# Patient Record
Sex: FEMALE | Race: BLACK OR AFRICAN AMERICAN | Employment: UNEMPLOYED | URBAN - METROPOLITAN AREA
[De-identification: names, ages, dates, MRNs, and addresses within clinical notes are randomized per-mention and may not be internally consistent; named-entity substitution may affect disease eponyms.]

---

## 2017-02-14 RX ORDER — QUETIAPINE FUMARATE 25 MG/1
TABLET, FILM COATED ORAL
Qty: 30 TAB | Refills: 0 | Status: SHIPPED | OUTPATIENT
Start: 2017-02-14 | End: 2017-05-04 | Stop reason: SDUPTHER

## 2017-08-24 RX ORDER — PRAVASTATIN SODIUM 20 MG/1
TABLET ORAL
Qty: 30 TAB | Refills: 11 | Status: SHIPPED | OUTPATIENT
Start: 2017-08-24 | End: 2018-05-16 | Stop reason: SDUPTHER

## 2017-11-04 RX ORDER — QUETIAPINE FUMARATE 25 MG/1
TABLET, FILM COATED ORAL
Qty: 30 TAB | Refills: 4 | Status: SHIPPED | OUTPATIENT
Start: 2017-11-04 | End: 2018-05-16 | Stop reason: SDUPTHER

## 2018-05-16 ENCOUNTER — OFFICE VISIT (OUTPATIENT)
Dept: INTERNAL MEDICINE CLINIC | Age: 43
End: 2018-05-16

## 2018-05-16 VITALS
HEART RATE: 91 BPM | OXYGEN SATURATION: 18 % | HEIGHT: 67 IN | WEIGHT: 265.8 LBS | BODY MASS INDEX: 41.72 KG/M2 | SYSTOLIC BLOOD PRESSURE: 107 MMHG | DIASTOLIC BLOOD PRESSURE: 73 MMHG | TEMPERATURE: 97.7 F

## 2018-05-16 DIAGNOSIS — Z00.00 MEDICARE ANNUAL WELLNESS VISIT, SUBSEQUENT: ICD-10-CM

## 2018-05-16 DIAGNOSIS — Z12.31 ENCOUNTER FOR SCREENING MAMMOGRAM FOR MALIGNANT NEOPLASM OF BREAST: ICD-10-CM

## 2018-05-16 DIAGNOSIS — Z13.31 SCREENING FOR DEPRESSION: ICD-10-CM

## 2018-05-16 DIAGNOSIS — E11.9 TYPE 2 DIABETES MELLITUS WITHOUT COMPLICATION, WITHOUT LONG-TERM CURRENT USE OF INSULIN (HCC): ICD-10-CM

## 2018-05-16 DIAGNOSIS — Z13.39 SCREENING FOR ALCOHOLISM: ICD-10-CM

## 2018-05-16 DIAGNOSIS — E66.01 OBESITY, MORBID (HCC): ICD-10-CM

## 2018-05-16 RX ORDER — PRAVASTATIN SODIUM 20 MG/1
20 TABLET ORAL
Qty: 30 TAB | Refills: 11 | Status: SHIPPED | OUTPATIENT
Start: 2018-05-16 | End: 2018-05-29 | Stop reason: SDUPTHER

## 2018-05-16 RX ORDER — QUETIAPINE FUMARATE 25 MG/1
25 TABLET, FILM COATED ORAL
Qty: 30 TAB | Refills: 11 | Status: SHIPPED | OUTPATIENT
Start: 2018-05-16 | End: 2019-06-11 | Stop reason: SDUPTHER

## 2018-05-16 NOTE — PATIENT INSTRUCTIONS
Medicare Wellness Visit, Female    The best way to live healthy is to have a healthy lifestyle by eating a well-balanced diet, exercising regularly, limiting alcohol and stopping smoking. Regular physical exams and screening tests are another way to keep healthy. Preventive exams provided by your health care provider can find health problems before they become diseases or illnesses. Preventive services including immunizations, screening tests, monitoring and exams can help you take care of your own health. All people over age 72 should have a pneumovax  and and a prevnar shot to prevent pneumonia. These are once in a lifetime unless you and your provider decide differently. All people over 65 should have a yearly flu shot and a tetanus vaccine every 10 years. A bone mass density to screen for osteoporosis or thinning of the bones should be done every 2 years after 65. Screening for diabetes mellitus with a blood sugar test should be done every year. Glaucoma is a disease of the eye due to increased ocular pressure that can lead to blindness and it should be done every year by an eye professional.    Cardiovascular screening tests that check for elevated lipids (fatty part of blood) which can lead to heart disease and strokes should be done every 5 years. Colorectal screening that evaluates for blood or polyps in your colon should be done yearly as a stool test or every five years as a flexible sigmoidoscope or every 10 years as a colonoscopy up to age 76. Breast cancer screening with a mammogram is recommended biennially  for women age 54-69. Screening for cervical cancer with a pap smear and pelvic exam is recommended for women after age 72 years every 2 years up to age 79 or when the provider and patient decide to stop. If there is a history of cervical abnormalities or other increased risk for cancer then the test is recommended yearly.     Hepatitis C screening is also recommended for anyone born between [birth year] through [address]. A shingles vaccine is also recommended once in a lifetime after age 61. Your Medicare Wellness Exam is recommended annually. Here is a list of your current Health Maintenance items with a due date:  Health Maintenance Due   Topic Date Due    Diabetic Foot Care  01/29/1984    Eye Exam  01/29/1984    Albumin Urine Test  08/04/2016    Hemoglobin A1C    01/05/2017    Cholesterol Test   07/05/2017    Annual Well Visit  03/14/2018    Cervical Cancer Screening  08/04/2018              Body Mass Index: Care Instructions  Your Care Instructions    Body mass index (BMI) can help you see if your weight is raising your risk for health problems. It uses a formula to compare how much you weigh with how tall you are. · A BMI lower than 18.5 is considered underweight. · A BMI between 18.5 and 24.9 is considered healthy. · A BMI between 25 and 29.9 is considered overweight. A BMI of 30 or higher is considered obese. If your BMI is in the normal range, it means that you have a lower risk for weight-related health problems. If your BMI is in the overweight or obese range, you may be at increased risk for weight-related health problems, such as high blood pressure, heart disease, stroke, arthritis or joint pain, and diabetes. If your BMI is in the underweight range, you may be at increased risk for health problems such as fatigue, lower protection (immunity) against illness, muscle loss, bone loss, hair loss, and hormone problems. BMI is just one measure of your risk for weight-related health problems. You may be at higher risk for health problems if you are not active, you eat an unhealthy diet, or you drink too much alcohol or use tobacco products. Follow-up care is a key part of your treatment and safety. Be sure to make and go to all appointments, and call your doctor if you are having problems.  It's also a good idea to know your test results and keep a list of the medicines you take. How can you care for yourself at home? · Practice healthy eating habits. This includes eating plenty of fruits, vegetables, whole grains, lean protein, and low-fat dairy. · If your doctor recommends it, get more exercise. Walking is a good choice. Bit by bit, increase the amount you walk every day. Try for at least 30 minutes on most days of the week. · Do not smoke. Smoking can increase your risk for health problems. If you need help quitting, talk to your doctor about stop-smoking programs and medicines. These can increase your chances of quitting for good. · Limit alcohol to 2 drinks a day for men and 1 drink a day for women. Too much alcohol can cause health problems. If you have a BMI higher than 25  · Your doctor may do other tests to check your risk for weight-related health problems. This may include measuring the distance around your waist. A waist measurement of more than 40 inches in men or 35 inches in women can increase the risk of weight-related health problems. · Talk with your doctor about steps you can take to stay healthy or improve your health. You may need to make lifestyle changes to lose weight and stay healthy, such as changing your diet and getting regular exercise. If you have a BMI lower than 18.5  · Your doctor may do other tests to check your risk for health problems. · Talk with your doctor about steps you can take to stay healthy or improve your health. You may need to make lifestyle changes to gain or maintain weight and stay healthy, such as getting more healthy foods in your diet and doing exercises to build muscle. Where can you learn more? Go to http://jane-lambert.info/. Enter S176 in the search box to learn more about \"Body Mass Index: Care Instructions. \"  Current as of: October 13, 2016  Content Version: 11.4  © 3498-2508 Healthwise, [employer].  Care instructions adapted under license by [employer] (which disclaims liability or warranty for this information). If you have questions about a medical condition or this instruction, always ask your healthcare professional. [place] [address] any warranty or liability for your use of this information.

## 2018-05-16 NOTE — ASSESSMENT & PLAN NOTE
Stable, based on history, physical exam and review of pertinent labs, studies and medications; meds reconciled; continue current treatment plan. Key Antihyperglycemic Medications     Patient is on no antihyperglycemic meds.         Other Key Diabetic Medications             pravastatin (PRAVACHOL) 20 mg tablet  (Taking) TAKE ONE TABLET BY MOUTH NIGHTLY        No results found for: HBA1C, PDV4LZEK, GKL5GAUX, GLU, CREA, CREAPOC, CREATEXT, MALBUR, MCALPOCT, MCACRPOC, MALBCRRATEXT, MCACR, MCAU, MCAU2, MALBEXT, CHOL, CHOLPOCT, HDL, HDLPOC, LDLC, LDL, LDLCEXT, LDLCPOC, TRIGL, TGLPOCT, [id number]  Diabetic Foot and Eye Exam HM Status   Topic Date Due    Diabetic Foot Care  01/29/1984    Eye Exam  01/29/1984

## 2018-05-16 NOTE — MR AVS SNAPSHOT
[address] [place] [address]. [address] [id number] 
[phone] Patient: Jose Mora MRN: [id number] :1975 Visit Information Date & Time Provider Department Dept. Phone Encounter #  
 2018 12:15 PM MD [facility] [phone] [id number] Follow-up Instructions Return in about 6 months (around 2018). Follow-up and Disposition History Upcoming Health Maintenance Date Due  
 FOOT EXAM Q1 1984 EYE EXAM RETINAL OR DILATED Q1 1984 MICROALBUMIN Q1 2016 HEMOGLOBIN A1C Q6M 2017 LIPID PANEL Q1 2017 MEDICARE YEARLY EXAM 3/14/2018 Pneumococcal 19-64 Medium Risk (1 of 1 - PPSV23) 2019* DTaP/Tdap/Td series (1 - Tdap) 2019* Influenza Age 5 to Adult 2018 *Topic was postponed. The date shown is not the original due date. Allergies as of 2018  Review Complete On: 2018 By: Julio C Iraheta MD  
 No Known Allergies Current Immunizations  Never Reviewed No immunizations on file. Not reviewed this visit You Were Diagnosed With   
  
 Codes Comments Medicare annual wellness visit, subsequent     ICD-10-CM: Z00.00 ICD-9-CM: V70.0 Screening for alcoholism     ICD-10-CM: Z13.89 ICD-9-CM: V79.1 Screening for depression     ICD-10-CM: Z13.89 ICD-9-CM: V79.0 Obesity, morbid ([facility] [address]     ICD-10-CM: E66.01 
ICD-9-CM: 278.01 Type 2 diabetes mellitus without complication, without long-term current use of insulin (HCC)     ICD-10-CM: E11.9 ICD-9-CM: 250.00 Encounter for screening mammogram for malignant neoplasm of breast     ICD-10-CM: Z12.31 
ICD-9-CM: V76.12 Vitals BP Pulse Temp Height(growth percentile) Weight(growth percentile) SpO2  
 107/73 91 97.7 °F (36.5 °C) (Oral) 5' 7\" (1.702 m) 265 lb 12.8 oz (120.6 kg) (!) 18% BMI OB Status Smoking Status 41.63 kg/m2 Hysterectomy Never Smoker Vitals History BMI and BSA Data Body Mass Index Body Surface Area  
 41.63 kg/m 2 2.39 m 2 Preferred Pharmacy Pharmacy Name Phone [address] [place] [address] [phone] Your Updated Medication List  
  
   
This list is accurate as of 5/16/18  2:31 PM.  Always use your most recent med list.  
  
  
  
  
 pravastatin 20 mg tablet Commonly known as:  PRAVACHOL Take 1 Tab by mouth nightly. QUEtiapine 25 mg tablet Commonly known as:  SEROquel Take 1 Tab by mouth nightly. Prescriptions Sent to Pharmacy Refills QUEtiapine (SEROQUEL) 25 mg tablet 11 Sig: Take 1 Tab by mouth nightly. Class: Normal  
 Pharmacy: [address] [place] [address] Ph #: [phone] Route: Oral  
 pravastatin (PRAVACHOL) 20 mg tablet 11 Sig: Take 1 Tab by mouth nightly. Class: Normal  
 Pharmacy: [address] [place] [address] Ph #: [phone] Route: Oral  
  
We Performed the Following CBC WITH AUTOMATED DIFF [39984 CPT(R)] COLLECTION VENOUS BLOOD,VENIPUNCTURE [id number] CPT(R)] [address] [[id number] HCP] HEMOGLOBIN A1C WITH EAG [90690 CPT(R)]  DIABETES FOOT EXAM [HM7 Custom] LIPID PANEL [97290 CPT(R)] METABOLIC PANEL, COMPREHENSIVE [73259 CPT(R)] MICROALBUMIN, UR, RAND W/ MICROALB/CREAT RATIO [id number] CPT(R)] [place] ANNUAL ALCOHOL SCREEN 15 MIN [id number] [facility]] REFERRAL TO OPHTHALMOLOGY [REF57 Custom] TSH 3RD GENERATION [23773 CPT(R)] URINALYSIS W/ RFLX MICROSCOPIC [53125 CPT(R)] Follow-up Instructions Return in about 6 months (around 11/16/2018). To-Do List   
 05/16/2018 Imaging:  [facility] BI SCREENING INCL CAD Referral Information Referral ID Referred By Referred To  
  
 [id number] Nedrow Gone E Not Available Visits Status Start Date End Date 1 New Request 5/16/18 5/16/19 If your referral has a status of pending review or denied, additional information will be sent to support the outcome of this decision. Patient Instructions Medicare Wellness Visit, Female The best way to live healthy is to have a healthy lifestyle by eating a well-balanced diet, exercising regularly, limiting alcohol and stopping smoking. Regular physical exams and screening tests are another way to keep healthy. Preventive exams provided by your health care provider can find health problems before they become diseases or illnesses. Preventive services including immunizations, screening tests, monitoring and exams can help you take care of your own health. All people over age 72 should have a pneumovax  and and a prevnar shot to prevent pneumonia. These are once in a lifetime unless you and your provider decide differently. All people over 65 should have a yearly flu shot and a tetanus vaccine every 10 years. A bone mass density to screen for osteoporosis or thinning of the bones should be done every 2 years after 65. Screening for diabetes mellitus with a blood sugar test should be done every year. Glaucoma is a disease of the eye due to increased ocular pressure that can lead to blindness and it should be done every year by an eye professional. 
 
Cardiovascular screening tests that check for elevated lipids (fatty part of blood) which can lead to heart disease and strokes should be done every 5 years. Colorectal screening that evaluates for blood or polyps in your colon should be done yearly as a stool test or every five years as a flexible sigmoidoscope or every 10 years as a colonoscopy up to age 76. Breast cancer screening with a mammogram is recommended biennially  for women age 54-69.  
 
Screening for cervical cancer with a pap smear and pelvic exam is recommended for women after age 72 years every 2 years up to age 79 or when the provider and patient decide to stop. If there is a history of cervical abnormalities or other increased risk for cancer then the test is recommended yearly. Hepatitis C screening is also recommended for anyone born between 80 through [address]. A shingles vaccine is also recommended once in a lifetime after age 61. Your Medicare Wellness Exam is recommended annually. Here is a list of your current Health Maintenance items with a due date: 
Health Maintenance Due Topic Date Due  
 Diabetic Foot Care  01/29/1984 [facility] Eye Exam  01/29/1984  Albumin Urine Test  08/04/2016  Hemoglobin A1C    01/05/2017  Cholesterol Test   07/05/2017 [facility] Annual Well Visit  03/14/2018  Cervical Cancer Screening  08/04/2018 Body Mass Index: Care Instructions Your Care Instructions Body mass index (BMI) can help you see if your weight is raising your risk for health problems. It uses a formula to compare how much you weigh with how tall you are. · A BMI lower than 18.5 is considered underweight. · A BMI between 18.5 and 24.9 is considered healthy. · A BMI between 25 and 29.9 is considered overweight. A BMI of 30 or higher is considered obese. If your BMI is in the normal range, it means that you have a lower risk for weight-related health problems. If your BMI is in the overweight or obese range, you may be at increased risk for weight-related health problems, such as high blood pressure, heart disease, stroke, arthritis or joint pain, and diabetes. If your BMI is in the underweight range, you may be at increased risk for health problems such as fatigue, lower protection (immunity) against illness, muscle loss, bone loss, hair loss, and hormone problems. BMI is just one measure of your risk for weight-related health problems.  You may be at higher risk for health problems if you are not active, you eat an unhealthy diet, or you drink too much alcohol or use tobacco products. Follow-up care is a key part of your treatment and safety. Be sure to make and go to all appointments, and call your doctor if you are having problems. It's also a good idea to know your test results and keep a list of the medicines you take. How can you care for yourself at home? · Practice healthy eating habits. This includes eating plenty of fruits, vegetables, whole grains, lean protein, and low-fat dairy. · If your doctor recommends it, get more exercise. Walking is a good choice. Bit by bit, increase the amount you walk every day. Try for at least 30 minutes on most days of the week. · Do not smoke. Smoking can increase your risk for health problems. If you need help quitting, talk to your doctor about stop-smoking programs and medicines. These can increase your chances of quitting for good. · Limit alcohol to 2 drinks a day for men and 1 drink a day for women. Too much alcohol can cause health problems. If you have a BMI higher than 25 · Your doctor may do other tests to check your risk for weight-related health problems. This may include measuring the distance around your waist. A waist measurement of more than 40 inches in men or 35 inches in women can increase the risk of weight-related health problems. · Talk with your doctor about steps you can take to stay healthy or improve your health. You may need to make lifestyle changes to lose weight and stay healthy, such as changing your diet and getting regular exercise. If you have a BMI lower than 18.5 · Your doctor may do other tests to check your risk for health problems. · Talk with your doctor about steps you can take to stay healthy or improve your health. You may need to make lifestyle changes to gain or maintain weight and stay healthy, such as getting more healthy foods in your diet and doing exercises to build muscle. Where can you learn more? Go to http://jane-lambert.info/. Enter S176 in the search box to learn more about \"Body Mass Index: Care Instructions. \" Current as of: October 13, 2016 Content Version: 11.4 © 3426-5865 [employer]. Care instructions adapted under license by [employer] (which disclaims liability or warranty for this information). If you have questions about a medical condition or this instruction, always ask your healthcare professional. [address] any warranty or liability for your use of this information. Patient Instructions History Introducing [facility]! Missy Villafuerte introduces [employer] patient portal. Now you can access parts of your medical record, email your doctor's office, and request medication refills online. 1. In your internet browser, go to [email or web address] 2. Click on the First Time User? Click Here link in the Sign In box. You will see the New Member Sign Up page. 3. Enter your [employer] Access Code exactly as it appears below. You will not need to use this code after youve completed the sign-up process. If you do not sign up before the expiration date, you must request a new code. · [employer] Access Code: [email or web address] Expires: 8/14/2018 12:59 PM 
 
4. Enter the last four digits of your Social Security Number (xxxx) and Date of Birth (mm/dd/yyyy) as indicated and click Submit. You will be taken to the next sign-up page. 5. Create a [employer] ID. This will be your [employer] login ID and cannot be changed, so think of one that is secure and easy to remember. 6. Create a [employer] password. You can change your password at any time. 7. Enter your Password Reset Question and Answer. This can be used at a later time if you forget your password. 8. Enter your e-mail address. You will receive e-mail notification when new information is available in [address] 9. Click Sign Up. You can now view and download portions of your medical record. 10. Click the Download Summary menu link to download a portable copy of your medical information. If you have questions, please visit the Frequently Asked Questions section of the [employer] website. Remember, [employer] is NOT to be used for urgent needs. For medical emergencies, dial 911. Now available from your iPhone and Android! Please provide this summary of care documentation to your next provider. Your primary care clinician is listed as Trace Mcgovern. If you have any questions after today's visit, please call [phone].

## 2018-05-16 NOTE — PROGRESS NOTES
1. Have you been to the ER, urgent care clinic since your last visit? Hospitalized since your last visit? No    2. Have you seen or consulted any other health care providers outside of the [address] [place] [address] since your last visit? Include any pap smears or colon screening. No           This is the Subsequent Medicare Annual Wellness Exam, performed 12 months or more after the Initial AWV or the last Subsequent AWV    I have reviewed the patient's medical history in detail and updated the computerized patient record. History     Past Medical History:   Diagnosis Date    DM [facility]di[facility] mellitus) [facility]     Mental retardation     Obesity, morbid ([facility]     GARCÍA on CPAP     8 cm    S/P reduction mammoplasty 1995    S/P ANDRE (total abdominal hysterectomy)       History reviewed. No pertinent surgical history. Current Outpatient Prescriptions   Medication Sig Dispense Refill    QUEtiapine (SEROQUEL) 25 mg tablet Take 1 Tab by mouth nightly. 30 Tab 11    pravastatin (PRAVACHOL) 20 mg tablet Take 1 Tab by mouth nightly. 27 Tab 11     No Known Allergies  Family History   Problem Relation Age of Onset    Hypertension Mother     Diabetes Father      Social History   Substance Use Topics    Smoking status: Never Smoker    Smokeless tobacco: Never Used    Alcohol use No     Patient Active Problem List   Diagnosis Code    GARCÍA on CPAP G47.33, Z99.89    Obesity, morbid (HCC) E66.01    DM (diabetes mellitus) ([facility] E11.9    S/P NADRE (total abdominal hysterectomy) Z90.710    S/P reduction mammoplasty Z98.890       Depression Risk Factor Screening:     PHQ over the last two weeks 5/16/2018   Little interest or pleasure in doing things Not at all   Feeling down, depressed or hopeless Not at all   Total Score PHQ 2 0     Alcohol Risk Factor Screening: You do not drink alcohol or very rarely. Functional Ability and Level of Safety:   Hearing Loss  Hearing is good.     Activities of Daily Living  The home contains: no safety equipment. Patient needs help with:  transportation    Fall Risk  No flowsheet data found. Abuse Screen  Patient is not abused    Cognitive Screening   Evaluation of Cognitive Function:  Has your family/caregiver stated any concerns about your memory: no  Normal    Patient Care Team   Patient Care Team:  Kelby Zapata MD as PCP - General (Internal Medicine)    Assessment/Plan   Education and counseling provided:  Are appropriate based on today's review and evaluation    Diagnoses and all orders for this visit:    1. Medicare annual wellness visit, subsequent    2. Screening for alcoholism  -     Annual  Alcohol Screen 15 min ()    3. Screening for depression  -     Depression Screen Annual    4. Obesity, morbid ([facility]  Assessment & Plan:  Stable, based on history, physical exam and review of pertinent labs, studies and medications; meds reconciled; continue current treatment plan. Key Obesity Meds     Patient is on no anti-obesity meds. No results found for: LEPTN, INSUL, HBA1C, GLU, CHOL, CHOLPOCT, HDL, LDLC, LDL, LDLCEXT, LDLCPOC, TRIGL, TGLPOCT, TSH, NA, NAPOC, K, KPOCT, GPT, ALTPOC, ALT, SGOT, ASTPOC, VITD3, CRP, [id number], TSHEXT          5. Type 2 diabetes mellitus without complication, without long-term current use of insulin (HCC)  Assessment & Plan:  Stable, based on history, physical exam and review of pertinent labs, studies and medications; meds reconciled; continue current treatment plan. Key Antihyperglycemic Medications     Patient is on no antihyperglycemic meds.         Other Key Diabetic Medications             pravastatin (PRAVACHOL) 20 mg tablet  (Taking) TAKE ONE TABLET BY MOUTH NIGHTLY        No results found for: HBA1C, PEL1EAHQ, [id number], GLU, CREA, CREAPOC, CREATEXT, MALBUR, MCALPOCT, MCACRPOC, MALBCRRATEXT, MCACR, MCAU, MCAU2, MALBEXT, CHOL, CHOLPOCT, HDL, HDLPOC, LDLC, LDL, LDLCEXT, LDLCPOC, TRIGL, TGLPOCT, [id number]  Diabetic Foot and Eye Exam  Status   Topic Date Due    Diabetic Foot Care  01/29/1984    Eye Exam  01/29/1984       Orders:  -     MICROALBUMIN, UR, RAND W/ MICROALB/CREAT RATIO  -     LIPID PANEL  -     HEMOGLOBIN A1C WITH EAG  -     REFERRAL TO OPHTHALMOLOGY  -      DIABETES FOOT EXAM  -     URINALYSIS W/ RFLX MICROSCOPIC  -     CBC WITH AUTOMATED DIFF  -     METABOLIC PANEL, COMPREHENSIVE  -     TSH 3RD GENERATION  -     COLLECTION VENOUS BLOOD,VENIPUNCTURE  -     [facility] MAMMO BI SCREENING INCL CAD; Future    6. Encounter for screening mammogram for malignant neoplasm of breast   -     [facility] MAMMO BI SCREENING INCL CAD; Future    Other orders  -     QUEtiapine (SEROQUEL) 25 mg tablet; Take 1 Tab by mouth nightly. -     pravastatin (PRAVACHOL) 20 mg tablet; Take 1 Tab by mouth nightly. Health Maintenance Due   Topic Date Due    FOOT EXAM Q1  01/29/1984    EYE EXAM RETINAL OR DILATED Q1  01/29/1984    MICROALBUMIN Q1  08/04/2016    HEMOGLOBIN A1C Q6M  01/05/2017    LIPID PANEL Q1  07/05/2017    MEDICARE YEARLY EXAM  03/14/2018   Diagnoses and all orders for this visit:    1. Medicare annual wellness visit, subsequent    2. Screening for alcoholism  -     Annual  Alcohol Screen 15 min ()    3. Screening for depression  -     Depression Screen Annual    4. Obesity, morbid ([facility] [place] [facility]  Assessment & Plan:  Stable, based on history, physical exam and review of pertinent labs, studies and medications; meds reconciled; continue current treatment plan. Key Obesity Meds     Patient is on no anti-obesity meds. No results found for: LEPTN, INSUL, HBA1C, GLU, CHOL, CHOLPOCT, HDL, LDLC, LDL, LDLCEXT, LDLCPOC, TRIGL, TGLPOCT, TSH, NA, NAPOC, K, KPOCT, GPT, ALTPOC, ALT, SGOT, ASTPOC, VITD3, CRP, [id number], TSHEXT          5.  Type 2 diabetes mellitus without complication, without long-term current use of insulin (HCC)  Assessment & Plan:  Stable, based on history, physical exam and review of pertinent labs, studies and medications; meds reconciled; continue current treatment plan. Key Antihyperglycemic Medications     Patient is on no antihyperglycemic meds. Other Key Diabetic Medications             pravastatin (PRAVACHOL) 20 mg tablet  (Taking) TAKE ONE TABLET BY MOUTH NIGHTLY        No results found for: HBA1C, [id number], [id number], GLU, CREA, CREAPOC, CREATEXT, MALBUR, MCALPOCT, MCACRPOC, MALBCRRATEXT, MCACR, MCAU, MCAU2, MALBEXT, CHOL, CHOLPOCT, HDL, HDLPOC, LDLC, LDL, LDLCEXT, LDLCPOC, TRIGL, TGLPOCT, [id number]  Diabetic Foot and Eye Exam  Status   Topic Date Due    Diabetic Foot Care  01/29/1984    Eye Exam  01/29/1984       Orders:  -     MICROALBUMIN, UR, RAND W/ MICROALB/CREAT RATIO  -     LIPID PANEL  -     HEMOGLOBIN A1C WITH EAG  -     REFERRAL TO OPHTHALMOLOGY  -      DIABETES FOOT EXAM  -     URINALYSIS W/ RFLX MICROSCOPIC  -     CBC WITH AUTOMATED DIFF  -     METABOLIC PANEL, COMPREHENSIVE  -     TSH 3RD GENERATION  -     COLLECTION VENOUS BLOOD,VENIPUNCTURE  -     [facility] MAMMO BI SCREENING INCL CAD; Future    6. Encounter for screening mammogram for malignant neoplasm of breast   -     [facility] MAMMO BI SCREENING INCL CAD; Future    Other orders  -     QUEtiapine (SEROQUEL) 25 mg tablet; Take 1 Tab by mouth nightly. -     pravastatin (PRAVACHOL) 20 mg tablet; Take 1 Tab by mouth nightly. [facility]  Aroldo Nguyễn MD, [address] [address]  Phone:  [phone]  Fax: [phone]      Chief Complaint   Patient presents with    Annual Wellness Visit         SUBECTIVE:    Chip Cleaning is a 39 y.o. female The patient returns today with her mother with known history of mental retardation, diabetes mellitus, obesity, obstructive sleep apnea with refusal to wear the CPAP machine at night and is seen for evaluation. Mother reports no new concerns.           Current Outpatient Prescriptions   Medication Sig Dispense Refill    QUEtiapine (SEROQUEL) 25 mg tablet Take 1 Tab by mouth nightly. 30 Tab 11    pravastatin (PRAVACHOL) 20 mg tablet Take 1 Tab by mouth nightly. 27 Tab 11     Past Medical History:   Diagnosis Date    DM (diabetes mellitus) ([facility] [address]     Mental retardation     Obesity, morbid ([facility] [address]     GARCÍA on CPAP     8 cm    S/P reduction mammoplasty 1995    S/P ANDRE (total abdominal hysterectomy)      History reviewed. No pertinent surgical history. No Known Allergies    REVIEW OF SYSTEMS:   unobtainable. Social History     Social History    Marital status: SINGLE     Spouse name: N/A    Number of children: N/A    Years of education: N/A     Social History Main Topics    Smoking status: Never Smoker    Smokeless tobacco: Never Used    Alcohol use No    Drug use: No    Sexual activity: Not Currently     Other Topics Concern    None     Social History Narrative   r  Family History   Problem Relation Age of Onset    Hypertension Mother     Diabetes Father        OBJECTIVE:  Visit Vitals    /73    Pulse 91    Temp 97.7 °F (36.5 °C) (Oral)    Ht 5' 7\" (1.702 m)    Wt 265 lb 12.8 oz (120.6 kg)    SpO2 (!) 18%    BMI 41.63 kg/m2     ENT: perrla,  eom intact  NECK: supple. Thyroid normal  CHEST: clear to ascultation and percussion   HEART: regular rate and rhythm  ABD: soft, bowel sounds active  EXTREMITIES: no edema, pulse 1+     No visits with results within 3 Month(s) from this visit. Latest known visit with results is:    Office Visit on 07/05/2016   Component Date Value Ref Range Status    Specific Gravity 07/05/2016 CANCELED   Final-Edited    Comment: No urine specimen received.     Result canceled by the ancillary      pH (UA) 07/05/2016 CANCELED   Final-Edited    Comment: Test not performed    Result canceled by the ancillary      Protein 07/05/2016 CANCELED   Final-Edited    Comment: Test not performed    Result canceled by the ancillary      Glucose 07/05/2016 CANCELED   Final-Edited    Comment: Test not performed    Result canceled by the ancillary      Ketone 07/05/2016 CANCELED   Final-Edited    Comment: Test not performed    Result canceled by the ancillary      WBC 07/05/2016 9.4  3.4 - 10.8 x10E3/uL Final    RBC 07/05/2016 5.19  3.77 - 5.28 x10E6/uL Final    HGB 07/05/2016 12.6  11.1 - 15.9 g/dL Final    HCT 07/05/2016 38.9  34.0 - 46.6 % Final    MCV 07/05/2016 75* 79 - 97 fL Final    MCH 07/05/2016 24.3* 26.6 - 33.0 pg Final    MCHC 07/05/2016 32.4  31.5 - 35.7 g/dL Final    RDW 07/05/2016 16.3* 12.3 - 15.4 % Final    PLATELET 39/58/8242 408  150 - 379 x10E3/uL Final    NEUTROPHILS 07/05/2016 52  % Final    Lymphocytes 07/05/2016 41  % Final    MONOCYTES 07/05/2016 6  % Final    EOSINOPHILS 07/05/2016 1  % Final    BASOPHILS 07/05/2016 0  % Final    ABS. NEUTROPHILS 07/05/2016 4.9  1.4 - 7.0 x10E3/uL Final    Abs Lymphocytes 07/05/2016 3.8* 0.7 - 3.1 x10E3/uL Final    ABS. MONOCYTES 07/05/2016 0.5  0.1 - 0.9 x10E3/uL Final    ABS. EOSINOPHILS 07/05/2016 0.1  0.0 - 0.4 x10E3/uL Final    ABS. BASOPHILS 07/05/2016 0.0  0.0 - 0.2 x10E3/uL Final    IMMATURE GRANULOCYTES 07/05/2016 0  % Final    ABS. IMM.  GRANS. 07/05/2016 0.0  0.0 - 0.1 x10E3/uL Final    Glucose 07/05/2016 99  65 - 99 mg/dL Final    BUN 07/05/2016 11  6 - 24 mg/dL Final    Creatinine 07/05/2016 0.80  0.57 - 1.00 mg/dL Final    GFR est non-AA 07/05/2016 91  >59 mL/min/1.73 Final    GFR est AA 07/05/2016 105  >59 mL/min/1.73 Final    BUN/Creatinine ratio 07/05/2016 14  9 - 23 Final    Sodium 07/05/2016 143  134 - 144 mmol/L Final    Potassium 07/05/2016 4.2  3.5 - 5.2 mmol/L Final    Chloride 07/05/2016 102  97 - 108 mmol/L Final    CO2 07/05/2016 22  18 - 29 mmol/L Final    Calcium 07/05/2016 9.9  8.7 - 10.2 mg/dL Final    Protein, total 07/05/2016 7.2  6.0 - 8.5 g/dL Final    Albumin 07/05/2016 4.2  3.5 - 5.5 g/dL Final    GLOBULIN, TOTAL 07/05/2016 3.0  1.5 - 4.5 g/dL Final    A-G Ratio 07/05/2016 1.4  1.1 - 2.5 Final    Bilirubin, total 07/05/2016 0.4  0.0 - 1.2 mg/dL Final    Alk. phosphatase 07/05/2016 58  39 - 117 IU/L Final    AST (SGOT) 07/05/2016 16  0 - 40 IU/L Final    ALT (SGPT) 07/05/2016 15  0 - 32 IU/L Final    Cholesterol, total 07/05/2016 189  100 - 199 mg/dL Final    Triglyceride 07/05/2016 145  0 - 149 mg/dL Final    HDL Cholesterol 07/05/2016 28* >39 mg/dL Final    Comment: According to ATP-III Guidelines, HDL-C >59 mg/dL is considered a  negative risk factor for CHD.  VLDL, calculated 07/05/2016 29  5 - 40 mg/dL Final    LDL, calculated 07/05/2016 132* 0 - 99 mg/dL Final    TSH 07/05/2016 0.623  0.450 - 4.500 uIU/mL Final    Hemoglobin A1c 07/05/2016 6.6* 4.8 - 5.6 % Final    Comment:          Pre-diabetes: 5.7 - 6.4           Diabetes: >6.4           Glycemic control for adults with diabetes: <7.0      Estimated average glucose 07/05/2016 143  mg/dL Final          ASSESSMENT:  1. Medicare annual wellness visit, subsequent    2. Screening for alcoholism    3. Screening for depression    4. Obesity, morbid ([facility] [address]    5. Type 2 diabetes mellitus without complication, without long-term current use of insulin ([facility] [address]    6. Encounter for screening mammogram for malignant neoplasm of breast       Medical status is stable. Appropriate laboratory studies will be requested as well as mammogram and we will renew her medications. Her mother is doing an excellent job of caring for her. Her skin is completely intact. She has lost a pound since we last saw her. She will return to see us in about six months or sooner if she has any problems. Discussed the patient's BMI with her.   The BMI follow up plan is as follows:     dietary management education, guidance, and counseling  encourage exercise  monitor weight  prescribed dietary intake    An After Visit Summary was printed and given to the patien    PLAN:  .  Orders Placed This Encounter    Depression Screen Annual    [facility] MAMMO BI SCREENING INCL CAD    MICROALBUMIN, UR, RAND W/ MICROALB/CREAT RATIO    LIPID PANEL    HEMOGLOBIN A1C WITH EAG    URINALYSIS W/ RFLX MICROSCOPIC    CBC WITH AUTOMATED DIFF    METABOLIC PANEL, COMPREHENSIVE    TSH 3RD GENERATION    REFERRAL TO OPHTHALMOLOGY    QUEtiapine (SEROQUEL) 25 mg tablet    pravastatin (PRAVACHOL) 20 mg tablet       Follow-up Disposition:  Return in about 6 months (around 11/16/2018). ATTENTION:   This medical record was transcribed using an electronic medical records system. Although proofread, it may and can contain electronic and spelling errors. Other human spelling and other errors may be present. Corrections may be executed at a later time. Please feel free to contact us for any clarifications as needed.

## 2018-05-17 LAB
ALBUMIN SERPL-MCNC: 4.5 G/DL (ref 3.5–5.5)
ALBUMIN/CREAT UR: 13.7 MG/G CREAT (ref 0–30)
ALBUMIN/GLOB SERPL: 1.6 {RATIO} (ref 1.2–2.2)
ALP SERPL-CCNC: 55 IU/L (ref 39–117)
ALT SERPL-CCNC: 13 IU/L (ref 0–32)
APPEARANCE UR: ABNORMAL
AST SERPL-CCNC: 12 IU/L (ref 0–40)
BACTERIA #/AREA URNS HPF: ABNORMAL /[HPF]
BASOPHILS # BLD AUTO: 0 X10E3/UL (ref 0–0.2)
BASOPHILS NFR BLD AUTO: 0 %
BILIRUB SERPL-MCNC: 0.4 MG/DL (ref 0–1.2)
BILIRUB UR QL STRIP: NEGATIVE
BUN SERPL-MCNC: 10 MG/DL (ref 6–24)
BUN/CREAT SERPL: 12 (ref 9–23)
CALCIUM SERPL-MCNC: 9.8 MG/DL (ref 8.7–10.2)
CASTS URNS QL MICRO: ABNORMAL /LPF
CHLORIDE SERPL-SCNC: 98 MMOL/L (ref 96–106)
CHOLEST SERPL-MCNC: 171 MG/DL (ref 100–199)
CO2 SERPL-SCNC: 21 MMOL/L (ref 18–29)
COLOR UR: YELLOW
CREAT SERPL-MCNC: 0.82 MG/DL (ref 0.57–1)
CREAT UR-MCNC: 583.2 MG/DL
EOSINOPHIL # BLD AUTO: 0.1 X10E3/UL (ref 0–0.4)
EOSINOPHIL NFR BLD AUTO: 1 %
EPI CELLS #/AREA URNS HPF: >10 /HPF
ERYTHROCYTE [DISTWIDTH] IN BLOOD BY AUTOMATED COUNT: 16.1 % (ref 12.3–15.4)
EST. AVERAGE GLUCOSE BLD GHB EST-MCNC: 137 MG/DL
GFR SERPLBLD CREATININE-BSD FMLA CKD-EPI: 101 ML/MIN/1.73
GFR SERPLBLD CREATININE-BSD FMLA CKD-EPI: 87 ML/MIN/1.73
GLOBULIN SER CALC-MCNC: 2.8 G/DL (ref 1.5–4.5)
GLUCOSE SERPL-MCNC: 84 MG/DL (ref 65–99)
GLUCOSE UR QL: NEGATIVE
HBA1C MFR BLD: 6.4 % (ref 4.8–5.6)
HCT VFR BLD AUTO: 42.1 % (ref 34–46.6)
HDLC SERPL-MCNC: 30 MG/DL
HGB BLD-MCNC: 13.6 G/DL (ref 11.1–15.9)
HGB UR QL STRIP: NEGATIVE
IMM GRANULOCYTES # BLD: 0 X10E3/UL (ref 0–0.1)
IMM GRANULOCYTES NFR BLD: 0 %
KETONES UR QL STRIP: ABNORMAL
LDLC SERPL CALC-MCNC: 111 MG/DL (ref 0–99)
LEUKOCYTE ESTERASE UR QL STRIP: ABNORMAL
LYMPHOCYTES # BLD AUTO: 3.5 X10E3/UL (ref 0.7–3.1)
LYMPHOCYTES NFR BLD AUTO: 43 %
MCH RBC QN AUTO: 25.4 PG (ref 26.6–33)
MCHC RBC AUTO-ENTMCNC: 32.3 G/DL (ref 31.5–35.7)
MCV RBC AUTO: 79 FL (ref 79–97)
MICRO URNS: ABNORMAL
MICROALBUMIN UR-MCNC: 80 UG/ML
MONOCYTES # BLD AUTO: 0.5 X10E3/UL (ref 0.1–0.9)
MONOCYTES NFR BLD AUTO: 6 %
MUCOUS THREADS URNS QL MICRO: PRESENT
NEUTROPHILS # BLD AUTO: 4 X10E3/UL (ref 1.4–7)
NEUTROPHILS NFR BLD AUTO: 50 %
NITRITE UR QL STRIP: NEGATIVE
PH UR STRIP: 5 [PH] (ref 5–7.5)
PLATELET # BLD AUTO: 327 X10E3/UL (ref 150–379)
POTASSIUM SERPL-SCNC: 4.4 MMOL/L (ref 3.5–5.2)
PROT SERPL-MCNC: 7.3 G/DL (ref 6–8.5)
PROT UR QL STRIP: ABNORMAL
RBC # BLD AUTO: 5.36 X10E6/UL (ref 3.77–5.28)
RBC #/AREA URNS HPF: ABNORMAL /HPF
SODIUM SERPL-SCNC: 140 MMOL/L (ref 134–144)
SP GR UR: >=1.03 (ref 1–1.03)
TRIGL SERPL-MCNC: 151 MG/DL (ref 0–149)
TSH SERPL DL<=0.005 MIU/L-ACNC: 1.01 UIU/ML (ref 0.45–4.5)
UROBILINOGEN UR STRIP-MCNC: 0.2 MG/DL (ref 0.2–1)
VLDLC SERPL CALC-MCNC: 30 MG/DL (ref 5–40)
WBC # BLD AUTO: 8.1 X10E3/UL (ref 3.4–10.8)
WBC #/AREA URNS HPF: ABNORMAL /HPF

## 2018-05-29 RX ORDER — PRAVASTATIN SODIUM 20 MG/1
20 TABLET ORAL
Qty: 90 TAB | Refills: 3 | Status: SHIPPED | OUTPATIENT
Start: 2018-05-29 | End: 2019-06-12 | Stop reason: SDUPTHER

## 2018-11-14 ENCOUNTER — OFFICE VISIT (OUTPATIENT)
Dept: INTERNAL MEDICINE CLINIC | Age: 43
End: 2018-11-14

## 2018-11-14 VITALS
HEART RATE: 94 BPM | OXYGEN SATURATION: 95 % | HEIGHT: 67 IN | BODY MASS INDEX: 42.64 KG/M2 | RESPIRATION RATE: 20 BRPM | DIASTOLIC BLOOD PRESSURE: 84 MMHG | SYSTOLIC BLOOD PRESSURE: 122 MMHG | WEIGHT: 271.7 LBS | TEMPERATURE: 99 F

## 2018-11-14 DIAGNOSIS — Z99.89 OSA ON CPAP: ICD-10-CM

## 2018-11-14 DIAGNOSIS — G47.33 OSA ON CPAP: ICD-10-CM

## 2018-11-14 DIAGNOSIS — E11.9 TYPE 2 DIABETES MELLITUS WITHOUT COMPLICATION, WITHOUT LONG-TERM CURRENT USE OF INSULIN (HCC): Primary | ICD-10-CM

## 2018-11-14 DIAGNOSIS — E66.01 OBESITY, MORBID (HCC): ICD-10-CM

## 2018-11-14 DIAGNOSIS — E04.9 GOITER: ICD-10-CM

## 2018-11-14 RX ORDER — AZITHROMYCIN 100 MG/5ML
250 POWDER, FOR SUSPENSION ORAL DAILY
Qty: 75 ML | Refills: 1 | Status: SHIPPED | OUTPATIENT
Start: 2018-11-14 | End: 2018-11-20

## 2018-11-14 NOTE — PROGRESS NOTES
[facility] Anju Harry MD, [address] [address] Phone:  [phone]  Fax: [phone] Chief Complaint Patient presents with  Cold Symptoms [place] SUBJECTIVE: 
  Laquita Hennessy is a 39 y.o. female Patient returns today with known history of morbid obesity, mental retardation, diabetes, obstructive sleep apnea, on CPAP, and is seen for evaluation. Patient is unable to give a history. Her mother tells me she has a cough productive of mucopurulent sputum and congestion particularly at night. She continues to refuse to use the CPAP machine. Current Outpatient Medications Medication Sig Dispense Refill  azithromycin (ZITHROMAX) 100 mg/5 mL suspension Take 12.5 mL by mouth daily for 6 days. On day 1 take 25 ml 75 mL 1  
 pravastatin (PRAVACHOL) 20 mg tablet Take 1 Tab by mouth nightly. 90 Tab 3  
 QUEtiapine (SEROQUEL) 25 mg tablet Take 1 Tab by mouth nightly. 30 Tab 11 Past Medical History:  
Diagnosis Date  DM (diabetes mellitus) ([facility] [address]  Mental retardation  Obesity, morbid ([facility] [address]  GARCÍA on CPAP   
 8 cm  S/P reduction mammoplasty 1995  S/P ANDRE (total abdominal hysterectomy) History reviewed. No pertinent surgical history. No Known Allergies REVIEW OF SYSTEMS: 
General: negative for - chills or fever ENT: negative for - headaches, nasal congestion or tinnitus Respiratory: negative for - cough, hemoptysis, shortness of breath or wheezing Cardiovascular : negative for - chest pain, edema, palpitations or shortness of breath Gastrointestinal: negative for - abdominal pain, blood in stools, heartburn or nausea/vomiting Genito-Urinary: no dysuria, trouble voiding, or hematuria Musculoskeletal: negative for - gait disturbance, joint pain, joint stiffness or joint swelling Neurological: no TIA or stroke symptoms Hematologic: no bruises, no bleeding, no swollen glands Integument: no lumps, mole changes, nail changes or rash Endocrine: no malaise/lethargy or unexpected weight changes Social History Socioeconomic History  Marital status: SINGLE Spouse name: Not on file  Number of children: Not on file  Years of education: Not on file  Highest education level: Not on file Social Needs  Financial resource strain: Not on file  Food insecurity - worry: Not on file  Food insecurity - inability: Not on file  Transportation needs - medical: Not on file  Transportation needs - non-medical: Not on file Occupational History  Not on file Tobacco Use  Smoking status: Never Smoker  Smokeless tobacco: Never Used Substance and Sexual Activity  Alcohol use: No  
 Drug use: No  
 Sexual activity: Not Currently Other Topics Concern  Not on file Social History Narrative  Not on file Family History Problem Relation Age of Onset  Hypertension Mother  Diabetes Father OBJECTIVE: 
 
Visit Vitals /84 Pulse 94 Temp 99 °F (37.2 °C) (Oral) Resp 20 Ht 5' 7\" (1.702 m) Wt 271 lb 11.2 oz (123.2 kg) SpO2 95% BMI 42.55 kg/m² CONSTITUTIONAL: well , well nourished, appears age appropriate EYES: perrla, eom intact ENMT:moist mucous membranes, pharynx clear NECK: supple. Thyroid normal 
RESPIRATORY: Chest: clear bilaterally CARDIOVASCULAR: Heart: regular rate and rhythm GASTROINTESTINAL: Abdomen: soft, bowel sounds active HEMATOLOGIC: no pathological lymph nodes palpated MUSCULOSKELETAL: Extremities: no edema, pulse 1+ INTEGUMENT: No unusual rashes or suspicious skin lesions noted. Nails appear normal. 
NEUROLOGIC: non-focal exam  
MENTAL STATUS: alert and oriented, appropriate affect ASSESSMENT: 
1. Type 2 diabetes mellitus without complication, without long-term current use of insulin ([address] 2. Obesity, morbid ([address] 3.  GARCÍA on CPAP   
 
 She has the habitus compatible with obstructive sleep apnea and it is unfortunate that she is unable to use the machine. She has symptoms compatible with acute bronchitis. Will do a chest xray to exclude pneumonia. She does not tolerate tablets and therefore will give her liquid antibiotic for next seven days. We note another 6 lb weight gain and that is a concern. We will check her Hgb A1c for blood sugar control. Blood pressure control is at goal. 
 
She will return to the office in six months, sooner if she has any problems. I have discussed the diagnosis with the patient and the intended plan as seen in the 
orders above. The patient understands and agees with the plan. The patient has  
received an after visit summary and questions were answered concerning 
future plans Patient labs and/or xrays were reviewed Past records were reviewed. PLAN: 
. Orders Placed This Encounter  XR CHEST PA LAT  CBC WITH AUTOMATED DIFF  
 HEMOGLOBIN A1C WITH EAG  
 REFERRAL TO OPHTHALMOLOGY  azithromycin (ZITHROMAX) 100 mg/5 mL suspension Follow-up Disposition: 
Return in about 6 months (around 5/14/2019). ATTENTION:  
This medical record was transcribed using an electronic medical records system. Although proofread, it may and can contain electronic and spelling errors. Other human spelling and other errors may be present. Corrections may be executed at a later time. Please feel free to contact us for any clarifications as needed.

## 2018-11-14 NOTE — PROGRESS NOTES
1. Have you been to the ER, urgent care clinic since your last visit? Hospitalized since your last visit? No 
 
2. Have you seen or consulted any other health care providers outside of the [address] [place] [address] since your last visit? Include any pap smears or colon screening. No  
 
Cold symptoms and heavy breathing

## 2018-11-15 LAB
BASOPHILS # BLD AUTO: 0 X10E3/UL (ref 0–0.2)
BASOPHILS NFR BLD AUTO: 0 %
EOSINOPHIL # BLD AUTO: 0.2 X10E3/UL (ref 0–0.4)
EOSINOPHIL NFR BLD AUTO: 2 %
ERYTHROCYTE [DISTWIDTH] IN BLOOD BY AUTOMATED COUNT: 16 % (ref 12.3–15.4)
EST. AVERAGE GLUCOSE BLD GHB EST-MCNC: 143 MG/DL
HBA1C MFR BLD: 6.6 % (ref 4.8–5.6)
HCT VFR BLD AUTO: 42 % (ref 34–46.6)
HGB BLD-MCNC: 13.4 G/DL (ref 11.1–15.9)
IMM GRANULOCYTES # BLD: 0 X10E3/UL (ref 0–0.1)
IMM GRANULOCYTES NFR BLD: 0 %
LYMPHOCYTES # BLD AUTO: 4 X10E3/UL (ref 0.7–3.1)
LYMPHOCYTES NFR BLD AUTO: 49 %
MCH RBC QN AUTO: 25.1 PG (ref 26.6–33)
MCHC RBC AUTO-ENTMCNC: 31.9 G/DL (ref 31.5–35.7)
MCV RBC AUTO: 79 FL (ref 79–97)
MONOCYTES # BLD AUTO: 0.4 X10E3/UL (ref 0.1–0.9)
MONOCYTES NFR BLD AUTO: 4 %
NEUTROPHILS # BLD AUTO: 3.7 X10E3/UL (ref 1.4–7)
NEUTROPHILS NFR BLD AUTO: 45 %
PLATELET # BLD AUTO: 337 X10E3/UL (ref 150–379)
RBC # BLD AUTO: 5.33 X10E6/UL (ref 3.77–5.28)
SPECIMEN STATUS REPORT, ROLRST: NORMAL
WBC # BLD AUTO: 8.2 X10E3/UL (ref 3.4–10.8)

## 2018-11-28 ENCOUNTER — HOSPITAL ENCOUNTER (OUTPATIENT)
Dept: ULTRASOUND IMAGING | Age: 43
Discharge: HOME | End: 2018-11-28
Attending: INTERNAL MEDICINE
Payer: MEDICARE

## 2018-11-28 DIAGNOSIS — E04.9 GOITER: Primary | ICD-10-CM

## 2018-11-28 DIAGNOSIS — E04.9 GOITER: ICD-10-CM

## 2018-11-28 PROCEDURE — 76536 US EXAM OF HEAD AND NECK: CPT

## 2018-12-14 ENCOUNTER — HOSPITAL ENCOUNTER (OUTPATIENT)
Dept: ULTRASOUND IMAGING | Age: 43
Discharge: HOME | End: 2018-12-14
Attending: INTERNAL MEDICINE
Payer: MEDICARE

## 2018-12-14 DIAGNOSIS — E04.9 GOITER: ICD-10-CM

## 2018-12-14 PROCEDURE — 77030014115

## 2018-12-14 PROCEDURE — 88172 CYTP DX EVAL FNA 1ST EA SITE: CPT

## 2018-12-14 PROCEDURE — 74011250636 HC RX REV CODE- 250/636: Performed by: RADIOLOGY

## 2018-12-14 PROCEDURE — 88173 CYTOPATH EVAL FNA REPORT: CPT

## 2018-12-14 PROCEDURE — 10022 US GUIDE FINE NDL ASP W IMAGE: CPT

## 2018-12-14 RX ORDER — LIDOCAINE HYDROCHLORIDE 10 MG/ML
5 INJECTION, SOLUTION EPIDURAL; INFILTRATION; INTRACAUDAL; PERINEURAL
Status: COMPLETED | OUTPATIENT
Start: 2018-12-14 | End: 2018-12-14

## 2018-12-14 RX ORDER — SODIUM BICARBONATE 42 MG/ML
1 INJECTION, SOLUTION INTRAVENOUS
Status: ACTIVE | OUTPATIENT
Start: 2018-12-14 | End: 2018-12-15

## 2018-12-14 RX ADMIN — LIDOCAINE HYDROCHLORIDE 5 ML: 10 INJECTION, SOLUTION EPIDURAL; INFILTRATION; INTRACAUDAL; PERINEURAL at 14:00

## 2018-12-14 NOTE — DISCHARGE INSTRUCTIONS
[address] [facility]  Department of Radiology  [phone] Ultrasound Department  [phone] Emergency Department    BIOPSY DISCHARGE INSTRUCTIONS for     General Instructions:  - A biopsy is the removal of a small piece of tissue for microscopic examination or testing.  - Healthy tissue can be obtained for the purpose of tissue-type matching for transplants. - Unhealthy tissues are more commonly biopsied to diagnose disease. General Biopsy:  - A mass can grow in any area of the body, and we are taking a specimen as ordered by your doctor. - The risks are the same. They include bleeding, pain, and infection. Home Care Instructions:  - You may resume your regular diet and medication regimen. - You may use over the counter acetaminophen (Tylenol) or ibuprofen (Advil) for the soreness. - You may apply an ice pack to the affected area for 20-30 minutes at time for the first 24 hours. -- After that, you may apply a heat pack. - You may remove the bandaid(s) tonight. - You may take a shower tonight. - Please keep the site clean and dry for 24-48 hours. - Do not soak in any kind of water (bath tub, hot tub, pool, ocean, etc) for 24-48 hours. - Do not participate in any kind of activity making you vigorously sweat for 24-48 hours. - Do not use any moisturizer/makeup/perfume on the site for 24-48 hours. Call If:  - You should call your doctor if you have any questions or concerns about the biopsy site. - Call if you should have increased pain, fever, redness, drainage, or bleeding more than a small spot on the bandage.     Follow-Up Instructions:  - Please see your doctor as he has requested.            _____________________________   _____________________________  Patient Signature     Technologist Signature

## 2019-06-11 RX ORDER — QUETIAPINE FUMARATE 25 MG/1
TABLET, FILM COATED ORAL
Qty: 30 TAB | Refills: 11 | Status: SHIPPED | OUTPATIENT
Start: 2019-06-11 | End: 2019-06-12 | Stop reason: SDUPTHER

## 2019-06-12 ENCOUNTER — OFFICE VISIT (OUTPATIENT)
Dept: INTERNAL MEDICINE CLINIC | Age: 44
End: 2019-06-12

## 2019-06-12 VITALS
WEIGHT: 275.2 LBS | OXYGEN SATURATION: 93 % | TEMPERATURE: 97.3 F | RESPIRATION RATE: 20 BRPM | DIASTOLIC BLOOD PRESSURE: 70 MMHG | SYSTOLIC BLOOD PRESSURE: 104 MMHG | HEIGHT: 67 IN | HEART RATE: 93 BPM | BODY MASS INDEX: 43.19 KG/M2

## 2019-06-12 DIAGNOSIS — Z13.31 SCREENING FOR DEPRESSION: ICD-10-CM

## 2019-06-12 DIAGNOSIS — E04.9 GOITER: ICD-10-CM

## 2019-06-12 DIAGNOSIS — Z12.31 ENCOUNTER FOR SCREENING MAMMOGRAM FOR MALIGNANT NEOPLASM OF BREAST: ICD-10-CM

## 2019-06-12 DIAGNOSIS — G47.33 OSA ON CPAP: ICD-10-CM

## 2019-06-12 DIAGNOSIS — Z00.00 MEDICARE ANNUAL WELLNESS VISIT, SUBSEQUENT: Primary | ICD-10-CM

## 2019-06-12 DIAGNOSIS — Z98.890 S/P REDUCTION MAMMOPLASTY: ICD-10-CM

## 2019-06-12 DIAGNOSIS — E11.9 TYPE 2 DIABETES MELLITUS WITHOUT COMPLICATION, WITHOUT LONG-TERM CURRENT USE OF INSULIN (HCC): ICD-10-CM

## 2019-06-12 DIAGNOSIS — Z99.89 OSA ON CPAP: ICD-10-CM

## 2019-06-12 DIAGNOSIS — Z13.39 SCREENING FOR ALCOHOLISM: ICD-10-CM

## 2019-06-12 DIAGNOSIS — E66.01 OBESITY, MORBID (HCC): ICD-10-CM

## 2019-06-12 RX ORDER — QUETIAPINE FUMARATE 25 MG/1
TABLET, FILM COATED ORAL
Qty: 30 TAB | Refills: 11 | Status: SHIPPED | OUTPATIENT
Start: 2019-06-12 | End: 2020-07-03

## 2019-06-12 RX ORDER — PRAVASTATIN SODIUM 20 MG/1
20 TABLET ORAL
Qty: 90 TAB | Refills: 3 | Status: SHIPPED | OUTPATIENT
Start: 2019-06-12 | End: 2020-09-07

## 2019-06-12 NOTE — PATIENT INSTRUCTIONS
Medicare Wellness Visit, Female The best way to live healthy is to have a lifestyle where you eat a well-balanced diet, exercise regularly, limit alcohol use, and quit all forms of tobacco/nicotine, if applicable. Regular preventive services are another way to keep healthy. Preventive services (vaccines, screening tests, monitoring & exams) can help personalize your care plan, which helps you manage your own care. Screening tests can find health problems at the earliest stages, when they are easiest to treat. Pritesh Cohen follows the current, evidence-based guidelines published by the [facility] Lauri Brandi ([facility]) when recommending preventive services for our patients. Because we follow these guidelines, sometimes recommendations change over time as research supports it. (For example, mammograms used to be recommended annually. Even though Medicare will still pay for an annual mammogram, the newer guidelines recommend a mammogram every two years for women of average risk.) Of course, you and your doctor may decide to screen more often for some diseases, based on your risk and your health status. Preventive services for you include: - Medicare offers their members a free annual wellness visit, which is time for you and your primary care provider to discuss and plan for your preventive service needs. Take advantage of this benefit every year! 
-All adults over the age of 72 should receive the recommended pneumonia vaccines. Current USPSTF guidelines recommend a series of two vaccines for the best pneumonia protection.  
-All adults should have a flu vaccine yearly and a tetanus vaccine every 10 years. All adults age 61 and older should receive a shingles vaccine once in their lifetime.   
-A bone mass density test is recommended when a woman turns 65 to screen for osteoporosis. This test is only recommended one time, as a screening. Some providers will use this same test as a disease monitoring tool if you already have osteoporosis. -All adults age 38-68 who are overweight should have a diabetes screening test once every three years.  
-Other screening tests and preventive services for persons with diabetes include: an eye exam to screen for diabetic retinopathy, a kidney function test, a foot exam, and stricter control over your cholesterol.  
-Cardiovascular screening for adults with routine risk involves an electrocardiogram (ECG) at intervals determined by your doctor.  
-Colorectal cancer screenings should be done for adults age 54-65 with no increased risk factors for colorectal cancer. There are a number of acceptable methods of screening for this type of cancer. Each test has its own benefits and drawbacks. Discuss with your doctor what is most appropriate for you during your annual wellness visit. The different tests include: colonoscopy (considered the best screening method), a fecal occult blood test, a fecal DNA test, and sigmoidoscopy. -Breast cancer screenings are recommended every other year for women of normal risk, age 54-69. 
-Cervical cancer screenings for women over age 72 are only recommended with certain risk factors.  
-All adults born between [facility] should be screened once for Hepatitis C. Here is a list of your current Health Maintenance items (your personalized list of preventive services) with a due date: 
Health Maintenance Due Topic Date Due [facility] Eye Exam  01/29/1984  Hemoglobin A1C    05/14/2019 [facility] Diabetic Foot Care  05/16/2019  Albumin Urine Test  05/16/2019  Cholesterol Test   05/16/2019 [facility] Annual Well Visit  05/17/2019

## 2019-06-12 NOTE — PROGRESS NOTES
1. Have you been to the ER, urgent care clinic since your last visit? Hospitalized since your last visit? No    2. Have you seen or consulted any other health care providers outside of the [address] [place] [address] since your last visit? Include any pap smears or colon screening. No     Wants to discuss seroquel  This is the Subsequent Medicare Annual Wellness Exam, performed 12 months or more after the Initial AWV or the last Subsequent AWV    I have reviewed the patient's medical history in detail and updated the computerized patient record. History     Past Medical History:   Diagnosis Date    [facility]     Goiter     Mental retardation     Obesity, morbid ([facility]     GARCÍA on CPAP     8 cm    S/P reduction mammoplasty 1995    S/P ANDRE (total abdominal hysterectomy)     Thyroid nodule 12/18/2018    Consistent with a benign follicular nodule (includes adenomatoid nodule - fna      History reviewed. No pertinent surgical history. Current Outpatient Medications   Medication Sig Dispense Refill    QUEtiapine (SEROQUEL) 25 mg tablet TAKE 1 TABLET BY MOUTH NIGHTLY 30 Tab 11    pravastatin (PRAVACHOL) 20 mg tablet Take 1 Tab by mouth nightly.  80 Tab 3     No Known Allergies  Family History   Problem Relation Age of Onset    Hypertension Mother     Diabetes Father      Social History     Tobacco Use    Smoking status: Never Smoker    Smokeless tobacco: Never Used   Substance Use Topics    Alcohol use: No     Patient Active Problem List   Diagnosis Code    GARCÍA on CPAP G47.33, Z99.89    Obesity, morbid (HCC) E66.01    DM (diabetes mellitus) ([facility] E11.9    S/P ANDRE (total abdominal hysterectomy) Z90.710    S/P reduction mammoplasty Z98.890    Goiter E04.9       Depression Risk Factor Screening:     3 most recent PHQ Screens 6/12/2019   Little interest or pleasure in doing things Not at all   Feeling down, depressed, irritable, or hopeless Not at all   Total Score PHQ 2 0     Alcohol Risk Factor Screening: You do not drink alcohol or very rarely. Functional Ability and Level of Safety:   Hearing Loss  Hearing is good. Activities of Daily Living  The home contains: no safety equipment. Patient does total self care    Fall Risk  No flowsheet data found.     Abuse Screen  Patient is not abused    Cognitive Screening   Evaluation of Cognitive Function:  Has your family/caregiver stated any concerns about your memory: yes  Abnormal    Patient Care Team   Patient Care Team:  Femi Red MD as PCP - General (Internal Medicine)    Assessment/Plan   Education and counseling provided:  Are appropriate based on today's review and evaluation        Health Maintenance Due   Topic Date Due    EYE EXAM RETINAL OR DILATED  01/29/1984    HEMOGLOBIN A1C Q6M  05/14/2019    FOOT EXAM Q1  05/16/2019    MICROALBUMIN Q1  05/16/2019    LIPID PANEL Q1  05/16/2019    MEDICARE YEARLY EXAM  05/17/2019

## 2019-06-12 NOTE — PROGRESS NOTES
SPORTS MEDICINE AND PRIMARY [facility]ARE  Panda Elmore MD, [address] [address]  Phone:  [phone]  Fax: [phone]      Chief Complaint   Patient presents with    Annual Wellness Visit         SUBECTIVE:    Viri Orosco is a 38 y.o. female Patient returns today with known history of DM type 2, goiter, morbid obesity, GARCÍA, on CPAP, S/P reduction mammoplasty, S/P ANDRE, and is seen for evaluation. Her blood sugar control has been excellent with Hgb A1c 6.6 in November. She is diet controlled. Current Outpatient Medications   Medication Sig Dispense Refill    QUEtiapine (SEROQUEL) 25 mg tablet TAKE 1 TABLET BY MOUTH NIGHTLY 30 Tab 11    pravastatin (PRAVACHOL) 20 mg tablet Take 1 Tab by mouth nightly. 80 Tab 3     Past Medical History:   Diagnosis Date    DM (diabetes mellitus) ([address]     Goiter     Mental retardation     Obesity, morbid ([address]     GARCÍA on CPAP     8 cm    S/P reduction mammoplasty 1995    S/P ANDRE (total abdominal hysterectomy)     Thyroid nodule 12/18/2018    Consistent with a benign follicular nodule (includes adenomatoid nodule - fna     History reviewed. No pertinent surgical history. No Known Allergies    REVIEW OF SYSTEMS:   She has had a thyroid fine needle aspiration 12/18 of the largest of her multinodular goiter. It was on the left and consistent with benign follicular nodule or colloid nodule and no further evaluation was undertaken.         Social History     Socioeconomic History    Marital status: SINGLE     Spouse name: Not on file    Number of children: Not on file    Years of education: Not on file    Highest education level: Not on file   Tobacco Use    Smoking status: Never Smoker    Smokeless tobacco: Never Used   Substance and Sexual Activity    Alcohol use: No    Drug use: No    Sexual activity: Not Currently   r  Family History   Problem Relation Age of Onset    Hypertension Mother     Diabetes Father OBJECTIVE:  Visit Vitals  /70   Pulse 93   Temp 97.3 °F (36.3 °C) (Oral)   Resp 20   Ht 5' 7\" (1.702 m)   Wt 275 lb 3.2 oz (124.8 kg)   SpO2 93%   BMI 43.10 kg/m²     ENT: perrla,  eom intact  NECK: supple. Thyroid normal  CHEST: clear to ascultation and percussion   HEART: regular rate and rhythm  ABD: soft, bowel sounds active  EXTREMITIES: no edema, pulse 1+     No visits with results within 3 Month(s) from this visit. Latest known visit with results is:   Office Visit on 11/14/2018   Component Date Value Ref Range Status    WBC 11/14/2018 8.2  3.4 - 10.8 x10E3/uL Final    RBC 11/14/2018 5.33* 3.77 - 5.28 x10E6/uL Final    HGB 11/14/2018 13.4  11.1 - 15.9 g/dL Final    HCT 11/14/2018 42.0  34.0 - 46.6 % Final    MCV 11/14/2018 79  79 - 97 fL Final    MCH 11/14/2018 25.1* 26.6 - 33.0 pg Final    MCHC 11/14/2018 31.9  31.5 - 35.7 g/dL Final    RDW 11/14/2018 16.0* 12.3 - 15.4 % Final    PLATELET 08/55/3971 557  150 - 379 x10E3/uL Final    NEUTROPHILS 11/14/2018 45  Not Estab. % Final    Lymphocytes 11/14/2018 49  Not Estab. % Final    MONOCYTES 11/14/2018 4  Not Estab. % Final    EOSINOPHILS 11/14/2018 2  Not Estab. % Final    BASOPHILS 11/14/2018 0  Not Estab. % Final    ABS. NEUTROPHILS 11/14/2018 3.7  1.4 - 7.0 x10E3/uL Final    Abs Lymphocytes 11/14/2018 4.0* 0.7 - 3.1 x10E3/uL Final    ABS. MONOCYTES 11/14/2018 0.4  0.1 - 0.9 x10E3/uL Final    ABS. EOSINOPHILS 11/14/2018 0.2  0.0 - 0.4 x10E3/uL Final    ABS. BASOPHILS 11/14/2018 0.0  0.0 - 0.2 x10E3/uL Final    IMMATURE GRANULOCYTES 11/14/2018 0  Not Estab. % Final    ABS. IMM. GRANS.  11/14/2018 0.0  0.0 - 0.1 x10E3/uL Final    Hemoglobin A1c 11/14/2018 6.6* 4.8 - 5.6 % Final    Comment:          Prediabetes: 5.7 - 6.4           Diabetes: >6.4           Glycemic control for adults with diabetes: <7.0      Estimated average glucose 11/14/2018 143  mg/dL Final    SPECIMEN STATUS REPORT 11/14/2018 COMMENT   Final    Comment: No Test Indicated  A serum separator tube was received with no test indicated  Dear Doctor,  The requisition we received for the above patient has no test  indicated on the request form for one or more of the specimens  submitted. The [place] Code of [employer] requires a  written and signed request be forwarded to the testing laboratory  following the verbal order of a laboratory test.  Date:_________________________________  ICD-9/10 Diagnosis Code(s):___________________________________________  Physician or Authorized Designee Signature:  ______________________________________________________________________  Your signature confirms your order of the test(s) listed  Required test name(s):________________________________________________  Required test number(s):______________________________________________  Please provide requested information and fax to [phone]  to expedite testing. ASSESSMENT:  1. Medicare annual wellness visit, subsequent    2. Screening for alcoholism    3. Screening for depression    4. Type 2 diabetes mellitus without complication, without long-term current use of insulin ([address]    5. Goiter    6. Obesity, morbid ([address]    7. GARCÍA on CPAP    8. S/P reduction mammoplasty    9. Encounter for screening mammogram for malignant neoplasm of breast       Patient's medical status is stable. Mom is concerned about Seroquel. We advised her of risks, including cardiac. She wants to continue the medication. She remains on Pravastatin likewise. BMI remains a concern and she has gained another 4 lbs since we last saw her. Over the past year she has gained 10 lbs. We ask mom to watch her concentrated sweets in her diet. BP control is well into goal.    Appropriate lab studies have been requested. She will return to the office in one year, sooner if she has any problems. We complete a [facility] Participant Physical Examination.       I have discussed the diagnosis with the patient and the intended plan as seen in the  orders above. The patient understands and agees with the plan. The patient has   received an after visit summary and questions were answered concerning  future plans  Patient labs and/or xrays were reviewed  Past records were reviewed. PLAN:  .  Orders Placed This Encounter    Depression Screen Annual    TERRENCE MAMMO BI SCREENING INCL CAD    MICROALBUMIN, UR, RAND W/ MICROALB/CREAT RATIO    LIPID PANEL    HEMOGLOBIN A1C WITH EAG    URINALYSIS W/ RFLX MICROSCOPIC    CBC WITH AUTOMATED DIFF    METABOLIC PANEL, COMPREHENSIVE    TSH 3RD GENERATION    REFERRAL TO OPHTHALMOLOGY    QUEtiapine (SEROQUEL) 25 mg tablet    pravastatin (PRAVACHOL) 20 mg tablet       Follow-up and Dispositions    · Return in about 1 year (around 6/12/2020). ATTENTION:   This medical record was transcribed using an electronic medical records system. Although proofread, it may and can contain electronic and spelling errors. Other human spelling and other errors may be present. Corrections may be executed at a later time. Please feel free to contact us for any clarifications as needed.

## 2019-06-14 LAB
ALBUMIN SERPL-MCNC: 4.3 G/DL (ref 3.5–5.5)
ALBUMIN/GLOB SERPL: 1.5 {RATIO} (ref 1.2–2.2)
ALP SERPL-CCNC: 57 IU/L (ref 39–117)
ALT SERPL-CCNC: 9 IU/L (ref 0–32)
AST SERPL-CCNC: 12 IU/L (ref 0–40)
BASOPHILS # BLD AUTO: 0 X10E3/UL (ref 0–0.2)
BASOPHILS NFR BLD AUTO: 0 %
BILIRUB SERPL-MCNC: 0.4 MG/DL (ref 0–1.2)
BUN SERPL-MCNC: 9 MG/DL (ref 6–24)
BUN/CREAT SERPL: 15 (ref 9–23)
CALCIUM SERPL-MCNC: 10 MG/DL (ref 8.7–10.2)
CHLORIDE SERPL-SCNC: 99 MMOL/L (ref 96–106)
CHOLEST SERPL-MCNC: 159 MG/DL (ref 100–199)
CO2 SERPL-SCNC: 24 MMOL/L (ref 20–29)
CREAT SERPL-MCNC: 0.62 MG/DL (ref 0.57–1)
EOSINOPHIL # BLD AUTO: 0.1 X10E3/UL (ref 0–0.4)
EOSINOPHIL NFR BLD AUTO: 1 %
ERYTHROCYTE [DISTWIDTH] IN BLOOD BY AUTOMATED COUNT: 16.4 % (ref 12.3–15.4)
EST. AVERAGE GLUCOSE BLD GHB EST-MCNC: 137 MG/DL
GLOBULIN SER CALC-MCNC: 2.9 G/DL (ref 1.5–4.5)
GLUCOSE SERPL-MCNC: 87 MG/DL (ref 65–99)
HBA1C MFR BLD: 6.4 % (ref 4.8–5.6)
HCT VFR BLD AUTO: 42.1 % (ref 34–46.6)
HDLC SERPL-MCNC: 33 MG/DL
HGB BLD-MCNC: 13.2 G/DL (ref 11.1–15.9)
IMM GRANULOCYTES # BLD AUTO: 0 X10E3/UL (ref 0–0.1)
IMM GRANULOCYTES NFR BLD AUTO: 0 %
LDLC SERPL CALC-MCNC: 101 MG/DL (ref 0–99)
LYMPHOCYTES # BLD AUTO: 3.3 X10E3/UL (ref 0.7–3.1)
LYMPHOCYTES NFR BLD AUTO: 36 %
MCH RBC QN AUTO: 24.9 PG (ref 26.6–33)
MCHC RBC AUTO-ENTMCNC: 31.4 G/DL (ref 31.5–35.7)
MCV RBC AUTO: 79 FL (ref 79–97)
MONOCYTES # BLD AUTO: 0.6 X10E3/UL (ref 0.1–0.9)
MONOCYTES NFR BLD AUTO: 6 %
NEUTROPHILS # BLD AUTO: 5.2 X10E3/UL (ref 1.4–7)
NEUTROPHILS NFR BLD AUTO: 57 %
PLATELET # BLD AUTO: 334 X10E3/UL (ref 150–450)
POTASSIUM SERPL-SCNC: 4.3 MMOL/L (ref 3.5–5.2)
PROT SERPL-MCNC: 7.2 G/DL (ref 6–8.5)
RBC # BLD AUTO: 5.31 X10E6/UL (ref 3.77–5.28)
SODIUM SERPL-SCNC: 141 MMOL/L (ref 134–144)
TRIGL SERPL-MCNC: 127 MG/DL (ref 0–149)
TSH SERPL DL<=0.005 MIU/L-ACNC: 1.06 UIU/ML (ref 0.45–4.5)
VLDLC SERPL CALC-MCNC: 25 MG/DL (ref 5–40)
WBC # BLD AUTO: 9.2 X10E3/UL (ref 3.4–10.8)

## 2020-07-03 RX ORDER — QUETIAPINE FUMARATE 25 MG/1
TABLET, FILM COATED ORAL
Qty: 30 TAB | Refills: 0 | Status: SHIPPED | OUTPATIENT
Start: 2020-07-03 | End: 2020-08-09

## 2020-09-07 RX ORDER — PRAVASTATIN SODIUM 20 MG/1
TABLET ORAL
Qty: 90 TAB | Refills: 0 | Status: SHIPPED | OUTPATIENT
Start: 2020-09-07 | End: 2021-01-28

## 2020-12-22 ENCOUNTER — TRANSCRIBE ORDER (OUTPATIENT)
Dept: INTERNAL MEDICINE CLINIC | Age: 45
End: 2020-12-22

## 2021-01-28 RX ORDER — QUETIAPINE FUMARATE 25 MG/1
TABLET, FILM COATED ORAL
Qty: 30 TAB | Refills: 0 | Status: SHIPPED | OUTPATIENT
Start: 2021-01-28 | End: 2021-03-22 | Stop reason: SDUPTHER

## 2021-01-28 RX ORDER — PRAVASTATIN SODIUM 20 MG/1
TABLET ORAL
Qty: 90 TAB | Refills: 0 | Status: SHIPPED | OUTPATIENT
Start: 2021-01-28 | End: 2021-06-25

## 2021-03-22 RX ORDER — QUETIAPINE FUMARATE 25 MG/1
TABLET, FILM COATED ORAL
Qty: 30 TAB | Refills: 0 | Status: SHIPPED | OUTPATIENT
Start: 2021-03-22 | End: 2021-04-14

## 2021-06-25 RX ORDER — PRAVASTATIN SODIUM 20 MG/1
TABLET ORAL
Qty: 90 TABLET | Refills: 0 | Status: SHIPPED | OUTPATIENT
Start: 2021-06-25 | End: 2021-08-13 | Stop reason: SDUPTHER

## 2021-08-13 ENCOUNTER — OFFICE VISIT (OUTPATIENT)
Dept: INTERNAL MEDICINE CLINIC | Age: 46
End: 2021-08-13
Payer: MEDICARE

## 2021-08-13 VITALS
WEIGHT: 266.2 LBS | RESPIRATION RATE: 18 BRPM | DIASTOLIC BLOOD PRESSURE: 79 MMHG | HEIGHT: 67 IN | OXYGEN SATURATION: 92 % | HEART RATE: 98 BPM | SYSTOLIC BLOOD PRESSURE: 114 MMHG | BODY MASS INDEX: 41.78 KG/M2 | TEMPERATURE: 98.7 F

## 2021-08-13 DIAGNOSIS — Z00.00 MEDICARE ANNUAL WELLNESS VISIT, SUBSEQUENT: Primary | ICD-10-CM

## 2021-08-13 DIAGNOSIS — E04.9 GOITER: ICD-10-CM

## 2021-08-13 DIAGNOSIS — Z99.89 OSA ON CPAP: ICD-10-CM

## 2021-08-13 DIAGNOSIS — F79 INTELLECTUAL DISABILITY: ICD-10-CM

## 2021-08-13 DIAGNOSIS — Z12.31 ENCOUNTER FOR SCREENING MAMMOGRAM FOR MALIGNANT NEOPLASM OF BREAST: ICD-10-CM

## 2021-08-13 DIAGNOSIS — G47.33 OSA ON CPAP: ICD-10-CM

## 2021-08-13 DIAGNOSIS — E66.01 OBESITY, MORBID (HCC): ICD-10-CM

## 2021-08-13 DIAGNOSIS — Z13.31 SCREENING FOR DEPRESSION: ICD-10-CM

## 2021-08-13 DIAGNOSIS — E11.9 TYPE 2 DIABETES MELLITUS WITHOUT COMPLICATION, WITHOUT LONG-TERM CURRENT USE OF INSULIN (HCC): ICD-10-CM

## 2021-08-13 PROCEDURE — G8417 CALC BMI ABV UP PARAM F/U: HCPCS | Performed by: INTERNAL MEDICINE

## 2021-08-13 PROCEDURE — 3046F HEMOGLOBIN A1C LEVEL >9.0%: CPT | Performed by: INTERNAL MEDICINE

## 2021-08-13 PROCEDURE — G8510 SCR DEP NEG, NO PLAN REQD: HCPCS | Performed by: INTERNAL MEDICINE

## 2021-08-13 PROCEDURE — 99213 OFFICE O/P EST LOW 20 MIN: CPT | Performed by: INTERNAL MEDICINE

## 2021-08-13 PROCEDURE — 2022F DILAT RTA XM EVC RTNOPTHY: CPT | Performed by: INTERNAL MEDICINE

## 2021-08-13 PROCEDURE — G8427 DOCREV CUR MEDS BY ELIG CLIN: HCPCS | Performed by: INTERNAL MEDICINE

## 2021-08-13 PROCEDURE — G0439 PPPS, SUBSEQ VISIT: HCPCS | Performed by: INTERNAL MEDICINE

## 2021-08-13 RX ORDER — QUETIAPINE FUMARATE 25 MG/1
TABLET, FILM COATED ORAL
Qty: 30 TABLET | Refills: 0 | Status: SHIPPED | OUTPATIENT
Start: 2021-08-13 | End: 2022-05-20

## 2021-08-13 RX ORDER — PRAVASTATIN SODIUM 20 MG/1
TABLET ORAL
Qty: 90 TABLET | Refills: 3 | Status: SHIPPED | OUTPATIENT
Start: 2021-08-13 | End: 2022-08-29

## 2021-08-13 NOTE — PROGRESS NOTES
1. Have you been to the ER, urgent care clinic since your last visit? Hospitalized since your last visit? No    2. Have you seen or consulted any other health care providers outside of the [address] [place] [address] since your last visit? Include any pap smears or colon screening. No       Annual wellness   form  This is the Subsequent Medicare Annual Wellness Exam, performed 12 months or more after the Initial AWV or the last Subsequent AWV    I have reviewed the patient's medical history in detail and updated the computerized patient record. Assessment/Plan   Education and counseling provided:  Are appropriate based on today's review and evaluation         Depression Risk Factor Screening     3 most recent PHQ Screens 8/13/2021   Little interest or pleasure in doing things Not at all   Feeling down, depressed, irritable, or hopeless Not at all   Total Score PHQ 2 0       Alcohol Risk Screen    Do you average more than 1 drink per night or more than 7 drinks a week:  No    On any one occasion in the past three months have you have had more than 3 drinks containing alcohol:  No        Functional Ability and Level of Safety    Hearing: Hearing is good. Activities of Daily Living: The home contains: no safety equipment. Patient does total self care      Ambulation: with no difficulty     Fall Risk:  No flowsheet data found.    Abuse Screen:  Patient is not abused       Cognitive Screening    Has your family/caregiver stated any concerns about your memory: no     Cognitive Screening: not necessary    Health Maintenance Due     Health Maintenance Due   Topic Date Due    Hepatitis C Screening  Never done    DTaP/Tdap/Td series (1 - Tdap) Never done    Colorectal Cancer Screening Combo  Never done    MICROALBUMIN Q1  05/16/2019    Medicare Yearly Exam  06/12/2020    Foot Exam Q1  06/12/2020    A1C test (Diabetic or Prediabetic)  06/12/2020    Lipid Screen  06/12/2020    Eye Exam Retinal or Dilated 08/14/2021       Patient Care Team   Patient Care Team:  Alysia Poon MD as PCP - General (Internal Medicine)  Alysia Poon MD as PCP [facility]    History     Patient Active Problem List   Diagnosis Code    GARCÍA on CPAP G47.33, Z99.89    Obesity, morbid ([address] E66.01    DM (diabetes mellitus) ([address] E11.9    S/P ANDRE (total abdominal hysterectomy) Z90.710    S/P reduction mammoplasty Z98.890    Goiter E04.9     Past Medical History:   Diagnosis Date    DM (diabetes mellitus) ([address]     Goiter     Mental retardation     Obesity, morbid ([address]     GARCÍA on CPAP     8 cm    S/P reduction mammoplasty 1995    S/P ANDRE (total abdominal hysterectomy)     Thyroid nodule 12/18/2018    Consistent with a benign follicular nodule (includes adenomatoid nodule - fna      History reviewed. No pertinent surgical history.   Current Outpatient Medications   Medication Sig Dispense Refill    pravastatin (PRAVACHOL) 20 mg tablet Take 1 tablet by mouth nightly 90 Tablet 0    QUEtiapine (SEROquel) 25 mg tablet Take 1 tablet by mouth once daily future refills requirement appointment 30 Tablet 0     No Known Allergies    Family History   Problem Relation Age of Onset    Hypertension Mother     Diabetes Father      Social History     Tobacco Use    Smoking status: Never Smoker    Smokeless tobacco: Never Used   Substance Use Topics    Alcohol use: No         Johanna Pina LPN

## 2021-08-13 NOTE — PATIENT INSTRUCTIONS
Medicare Wellness Visit, Female     The best way to live healthy is to have a lifestyle where you eat a well-balanced diet, exercise regularly, limit alcohol use, and quit all forms of tobacco/nicotine, if applicable. Regular preventive services are another way to keep healthy. Preventive services (vaccines, screening tests, monitoring & exams) can help personalize your care plan, which helps you manage your own care. Screening tests can find health problems at the earliest stages, when they are easiest to treat. [facility] follows the current, evidence-based guidelines published by the [facility] Lauri Paz ([facility]) when recommending preventive services for our patients. Because we follow these guidelines, sometimes recommendations change over time as research supports it. (For example, mammograms used to be recommended annually. Even though Medicare will still pay for an annual mammogram, the newer guidelines recommend a mammogram every two years for women of average risk). Of course, you and your doctor may decide to screen more often for some diseases, based on your risk and your co-morbidities (chronic disease you are already diagnosed with). Preventive services for you include:  - Medicare offers their members a free annual wellness visit, which is time for you and your primary care provider to discuss and plan for your preventive service needs. Take advantage of this benefit every year!  -All adults over the age of 72 should receive the recommended pneumonia vaccines. Current USPSTF guidelines recommend a series of two vaccines for the best pneumonia protection.   -All adults should have a flu vaccine yearly and a tetanus vaccine every 10 years.   -All adults age 48 and older should receive the shingles vaccines (series of two vaccines).       -All adults age 38-68 who are overweight should have a diabetes screening test once every three years.   -All adults born between [birth year] and 1966 should be screened once for Hepatitis C.  -Other screening tests and preventive services for persons with diabetes include: an eye exam to screen for diabetic retinopathy, a kidney function test, a foot exam, and stricter control over your cholesterol.   -Cardiovascular screening for adults with routine risk involves an electrocardiogram (ECG) at intervals determined by your doctor.   -Colorectal cancer screenings should be done for adults age 54-65 with no increased risk factors for colorectal cancer. There are a number of acceptable methods of screening for this type of cancer. Each test has its own benefits and drawbacks. Discuss with your doctor what is most appropriate for you during your annual wellness visit. The different tests include: colonoscopy (considered the best screening method), a fecal occult blood test, a fecal DNA test, and sigmoidoscopy.    -A bone mass density test is recommended when a woman turns 65 to screen for osteoporosis. This test is only recommended one time, as a screening. Some providers will use this same test as a disease monitoring tool if you already have osteoporosis. -Breast cancer screenings are recommended every other year for women of normal risk, age 54-69.  -Cervical cancer screenings for women over age 72 are only recommended with certain risk factors.      Here is a list of your current Health Maintenance items (your personalized list of preventive services) with a due date:  Health Maintenance Due   Topic Date Due    Hepatitis C Test  Never done    DTaP/Tdap/Td  (1 - Tdap) Never done    Colorectal Screening  Never done    Albumin Urine Test  05/16/2019    Diabetic Foot Care  06/12/2020    Hemoglobin A1C    06/12/2020    Cholesterol Test   06/12/2020    Eye Exam  08/14/2021

## 2021-08-13 NOTE — PROGRESS NOTES
SPORTS MEDICINE AND PRIMARY CARE  Apolonia Alexis MD, [address] [address]  Phone:  [phone]  Fax: [phone]      Chief Complaint   Patient presents with    Annual Wellness Visit         SUBECTIVE:    Jordon Sharp is a 51 y.o. female Patient returns today with her mom with a known history of morbid obesity, diabetes mellitus type 2, goiter, obstructive sleep apnea, and is seen for evaluation. Current Outpatient Medications   Medication Sig Dispense Refill    pravastatin (PRAVACHOL) 20 mg tablet Take 1 tablet by mouth nightly 90 Tablet 3    QUEtiapine (SEROquel) 25 mg tablet Take 1 tablet by mouth once daily future refills requirement appointment 30 Tablet 0     Past Medical History:   Diagnosis Date    DM [facility]di[facility] mellitus) [facility] [address]     Goiter     Intellectual disability     Obesity, morbid ([facility] [address]     GARCÍA on CPAP     8 cm    S/P reduction mammoplasty 1995    S/P ANDRE (total abdominal hysterectomy)     Thyroid nodule 12/18/2018    Consistent with a benign follicular nodule (includes adenomatoid nodule - fna     History reviewed. No pertinent surgical history. No Known Allergies    REVIEW OF SYSTEMS:   ______________ (inaudible) unable to give a history.         Social History     Socioeconomic History    Marital status: SINGLE     Spouse name: Not on file    Number of children: Not on file    Years of education: Not on file    Highest education level: Not on file   Tobacco Use    Smoking status: Never Smoker    Smokeless tobacco: Never Used   Vaping Use    Vaping Use: Never used   Substance and Sexual Activity    Alcohol use: No    Drug use: No    Sexual activity: Not Currently   Social History Narrative    Juan Miguel Come -niece  will be responsible if mother is no longer able to care for her  [phone]     Social Determinants of Health     Financial Resource Strain:     Difficulty of Paying Living Expenses:    Food Insecurity:     Worried About Running Out of Food in the Last Year:    [address] in the Last Year:    Transportation Needs:     Lack of Transportation (Medical):  Lack of Transportation (Non-Medical):    Physical Activity:     Days of Exercise per Week:     Minutes of Exercise per Session:    Stress:     Feeling of Stress :    Social Connections:     Frequency of Communication with Friends and Family:     Frequency of Social Gatherings with Friends and Family:     Attends Protestant Services:     Active Member of Clubs or Organizations:     Attends Club or Organization Meetings:     Marital Status:    r  Family History   Problem Relation Age of Onset    Hypertension Mother     Diabetes Father        OBJECTIVE:  Visit Vitals  /79   Pulse 98   Temp 98.7 °F (37.1 °C) (Oral)   Resp 18   Ht 5' 7\" (1.702 m)   Wt 266 lb 3.2 oz (120.7 kg)   SpO2 92%   BMI 41.69 kg/m²     ENT: perrla,  eom intact  NECK: supple. Thyroid normal  CHEST: clear to ascultation and percussion   HEART: regular rate and rhythm  ABD: soft, bowel sounds active  EXTREMITIES: no edema, pulse 1+     No visits with results within 3 Month(s) from this visit.    Latest known visit with results is:   Office Visit on 06/12/2019   Component Date Value Ref Range Status    Cholesterol, total 06/12/2019 159  100 - 199 mg/dL Final    Triglyceride 06/12/2019 127  0 - 149 mg/dL Final    HDL Cholesterol 06/12/2019 33* >39 mg/dL Final    VLDL, calculated 06/12/2019 25  5 - 40 mg/dL Final    LDL, calculated 06/12/2019 101* 0 - 99 mg/dL Final    Hemoglobin A1c 06/12/2019 6.4* 4.8 - 5.6 % Final    Comment:          Prediabetes: 5.7 - 6.4           Diabetes: >6.4           Glycemic control for adults with diabetes: <7.0      Estimated average glucose 06/12/2019 137  mg/dL Final    WBC 06/12/2019 9.2  3.4 - 10.8 x10E3/uL Final    RBC 06/12/2019 5.31* 3.77 - 5.28 x10E6/uL Final    HGB 06/12/2019 13.2  11.1 - 15.9 g/dL Final    HCT 06/12/2019 42.1  34.0 - 46.6 % Final    MCV 06/12/2019 79  79 - 97 fL Final    MCH 06/12/2019 24.9* 26.6 - 33.0 pg Final    MCHC 06/12/2019 31.4* 31.5 - 35.7 g/dL Final    RDW 06/12/2019 16.4* 12.3 - 15.4 % Final    PLATELET 08/75/0747 573  150 - 450 x10E3/uL Final    NEUTROPHILS 06/12/2019 57  Not Estab. % Final    Lymphocytes 06/12/2019 36  Not Estab. % Final    MONOCYTES 06/12/2019 6  Not Estab. % Final    EOSINOPHILS 06/12/2019 1  Not Estab. % Final    BASOPHILS 06/12/2019 0  Not Estab. % Final    ABS. NEUTROPHILS 06/12/2019 5.2  1.4 - 7.0 x10E3/uL Final    Abs Lymphocytes 06/12/2019 3.3* 0.7 - 3.1 x10E3/uL Final    ABS. MONOCYTES 06/12/2019 0.6  0.1 - 0.9 x10E3/uL Final    ABS. EOSINOPHILS 06/12/2019 0.1  0.0 - 0.4 x10E3/uL Final    ABS. BASOPHILS 06/12/2019 0.0  0.0 - 0.2 x10E3/uL Final    IMMATURE GRANULOCYTES 06/12/2019 0  Not Estab. % Final    ABS. IMM. GRANS. 06/12/2019 0.0  0.0 - 0.1 x10E3/uL Final    Glucose 06/12/2019 87  65 - 99 mg/dL Final    BUN 06/12/2019 9  6 - 24 mg/dL Final    Creatinine 06/12/2019 0.62  0.57 - 1.00 mg/dL Final    GFR est non-AA 06/12/2019 109  >59 mL/min/1.73 Final    GFR est AA 06/12/2019 126  >59 mL/min/1.73 Final    BUN/Creatinine ratio 06/12/2019 15  9 - 23 Final    Sodium 06/12/2019 141  134 - 144 mmol/L Final    Potassium 06/12/2019 4.3  3.5 - 5.2 mmol/L Final    Chloride 06/12/2019 99  96 - 106 mmol/L Final    CO2 06/12/2019 24  20 - 29 mmol/L Final    Calcium 06/12/2019 10.0  8.7 - 10.2 mg/dL Final    Protein, total 06/12/2019 7.2  6.0 - 8.5 g/dL Final    Albumin 06/12/2019 4.3  3.5 - 5.5 g/dL Final    GLOBULIN, TOTAL 06/12/2019 2.9  1.5 - 4.5 g/dL Final    A-G Ratio 06/12/2019 1.5  1.2 - 2.2 Final    Bilirubin, total 06/12/2019 0.4  0.0 - 1.2 mg/dL Final    Alk.  phosphatase 06/12/2019 57  39 - 117 IU/L Final    AST (SGOT) 06/12/2019 12  0 - 40 IU/L Final    ALT (SGPT) 06/12/2019 9  0 - 32 IU/L Final    TSH 06/12/2019 1.060  0.450 - 4.500 uIU/mL Final ASSESSMENT:  1. Medicare annual wellness visit, subsequent    2. Screening for depression    3. Obesity, morbid ([facility] [address]    4. Type 2 diabetes mellitus without complication, without long-term current use of insulin ([facility] [address]    5. Goiter    6. GARCÍA on CPAP    7. Intellectual disability    8. Encounter for screening mammogram for malignant neoplasm of breast       We congratulate her mom. She has lost some weight since we last saw her. We will check hemoglobin A1c for control of her diabetes. She has intellectual disability, which is causing some behavioral issues, but she has been out of the Seroquel for two weeks. We suggest she stop the Seroquel completely. We will give her a 30 day supply to have on hand if she becomes a problem with management. She agrees with the plan. She uses a CPAP machine for obstructive sleep apnea. Appropriate lab studies were requested. She will be back to see me in one year. We discuss advanced care planning. Patient's mother wants her to be resuscitated, so she remains a full code. If something happens to the mother and she can no longer take care of her, Maira Garza at [phone] will be the responsible party. Mother says she has appropriate paperwork for that. She will be back to see me in one year. I have discussed the diagnosis with the patient and the intended plan as seen in the  orders above. The patient understands and agees with the plan. The patient has   received an after visit summary and questions were answered concerning  future plans  Patient labs and/or xrays were reviewed  Past records were reviewed.     PLAN:  .  Orders Placed This Encounter    ANNUAL DEPRESSION SCREEN 8-15 MIN    TERRENCE MAMMO BI SCREENING INCL CAD    CBC WITH AUTOMATED DIFF    METABOLIC PANEL, COMPREHENSIVE    LIPID PANEL    TSH 3RD GENERATION    HEMOGLOBIN A1C WITH EAG    pravastatin (PRAVACHOL) 20 mg tablet    QUEtiapine (SEROquel) 25 mg tablet       Follow-up and Dispositions    · Return in about 1 year (around 8/13/2022). ATTENTION:   This medical record was transcribed using an electronic medical records system. Although proofread, it may and can contain electronic and spelling errors. Other human spelling and other errors may be present. Corrections may be executed at a later time. Please feel free to contact us for any clarifications as needed.

## 2021-08-14 LAB
ALBUMIN SERPL-MCNC: 4 G/DL (ref 3.5–5)
ALBUMIN/GLOB SERPL: 1.2 {RATIO} (ref 1.1–2.2)
ALP SERPL-CCNC: 67 U/L (ref 45–117)
ALT SERPL-CCNC: 18 U/L (ref 12–78)
ANION GAP SERPL CALC-SCNC: 5 MMOL/L (ref 5–15)
AST SERPL-CCNC: 12 U/L (ref 15–37)
BASOPHILS # BLD: 0 K/UL (ref 0–0.1)
BASOPHILS NFR BLD: 0 % (ref 0–1)
BILIRUB SERPL-MCNC: 0.6 MG/DL (ref 0.2–1)
BUN SERPL-MCNC: 9 MG/DL (ref 6–20)
BUN/CREAT SERPL: 11 (ref 12–20)
CALCIUM SERPL-MCNC: 9.4 MG/DL (ref 8.5–10.1)
CHLORIDE SERPL-SCNC: 106 MMOL/L (ref 97–108)
CHOLEST SERPL-MCNC: 206 MG/DL
CO2 SERPL-SCNC: 31 MMOL/L (ref 21–32)
CREAT SERPL-MCNC: 0.84 MG/DL (ref 0.55–1.02)
DIFFERENTIAL METHOD BLD: ABNORMAL
EOSINOPHIL # BLD: 0.1 K/UL (ref 0–0.4)
EOSINOPHIL NFR BLD: 1 % (ref 0–7)
ERYTHROCYTE [DISTWIDTH] IN BLOOD BY AUTOMATED COUNT: 16.7 % (ref 11.5–14.5)
EST. AVERAGE GLUCOSE BLD GHB EST-MCNC: 137 MG/DL
GLOBULIN SER CALC-MCNC: 3.3 G/DL (ref 2–4)
GLUCOSE SERPL-MCNC: 92 MG/DL (ref 65–100)
HBA1C MFR BLD: 6.4 % (ref 4–5.6)
HCT VFR BLD AUTO: 45.4 % (ref 35–47)
HDLC SERPL-MCNC: 35 MG/DL
HDLC SERPL: 5.9 {RATIO} (ref 0–5)
HGB BLD-MCNC: 13.2 G/DL (ref 11.5–16)
IMM GRANULOCYTES # BLD AUTO: 0 K/UL (ref 0–0.04)
IMM GRANULOCYTES NFR BLD AUTO: 0 % (ref 0–0.5)
LDLC SERPL CALC-MCNC: 144.6 MG/DL (ref 0–100)
LYMPHOCYTES # BLD: 2.9 K/UL (ref 0.8–3.5)
LYMPHOCYTES NFR BLD: 38 % (ref 12–49)
MCH RBC QN AUTO: 24 PG (ref 26–34)
MCHC RBC AUTO-ENTMCNC: 29.1 G/DL (ref 30–36.5)
MCV RBC AUTO: 82.4 FL (ref 80–99)
MONOCYTES # BLD: 0.4 K/UL (ref 0–1)
MONOCYTES NFR BLD: 5 % (ref 5–13)
NEUTS SEG # BLD: 4.2 K/UL (ref 1.8–8)
NEUTS SEG NFR BLD: 56 % (ref 32–75)
NRBC # BLD: 0 K/UL (ref 0–0.01)
NRBC BLD-RTO: 0 PER 100 WBC
PLATELET # BLD AUTO: 306 K/UL (ref 150–400)
PMV BLD AUTO: 11.2 FL (ref 8.9–12.9)
POTASSIUM SERPL-SCNC: 4.2 MMOL/L (ref 3.5–5.1)
PROT SERPL-MCNC: 7.3 G/DL (ref 6.4–8.2)
RBC # BLD AUTO: 5.51 M/UL (ref 3.8–5.2)
SODIUM SERPL-SCNC: 142 MMOL/L (ref 136–145)
TRIGL SERPL-MCNC: 132 MG/DL (ref ?–150)
TSH SERPL DL<=0.05 MIU/L-ACNC: 0.8 UIU/ML (ref 0.36–3.74)
VLDLC SERPL CALC-MCNC: 26.4 MG/DL
WBC # BLD AUTO: 7.6 K/UL (ref 3.6–11)

## 2022-05-20 ENCOUNTER — OFFICE VISIT (OUTPATIENT)
Dept: INTERNAL MEDICINE CLINIC | Age: 47
End: 2022-05-20
Payer: MEDICARE

## 2022-05-20 VITALS
RESPIRATION RATE: 16 BRPM | HEART RATE: 55 BPM | BODY MASS INDEX: 40.7 KG/M2 | HEIGHT: 67 IN | WEIGHT: 259.3 LBS | OXYGEN SATURATION: 93 % | TEMPERATURE: 98 F | DIASTOLIC BLOOD PRESSURE: 77 MMHG | SYSTOLIC BLOOD PRESSURE: 124 MMHG

## 2022-05-20 DIAGNOSIS — R06.02 SHORTNESS OF BREATH: ICD-10-CM

## 2022-05-20 DIAGNOSIS — E11.9 TYPE 2 DIABETES MELLITUS WITHOUT COMPLICATION, WITHOUT LONG-TERM CURRENT USE OF INSULIN (HCC): ICD-10-CM

## 2022-05-20 DIAGNOSIS — F79 INTELLECTUAL DISABILITY: ICD-10-CM

## 2022-05-20 DIAGNOSIS — Z99.89 OSA ON CPAP: Primary | ICD-10-CM

## 2022-05-20 DIAGNOSIS — Z12.11 SCREEN FOR COLON CANCER: ICD-10-CM

## 2022-05-20 DIAGNOSIS — G47.33 OSA ON CPAP: Primary | ICD-10-CM

## 2022-05-20 DIAGNOSIS — E66.01 OBESITY, MORBID (HCC): ICD-10-CM

## 2022-05-20 DIAGNOSIS — E04.9 GOITER: ICD-10-CM

## 2022-05-20 DIAGNOSIS — E66.01 OBESITY, CLASS III, BMI 40-49.9 (MORBID OBESITY) (HCC): ICD-10-CM

## 2022-05-20 DIAGNOSIS — Z12.31 ENCOUNTER FOR SCREENING MAMMOGRAM FOR MALIGNANT NEOPLASM OF BREAST: ICD-10-CM

## 2022-05-20 PROCEDURE — G8427 DOCREV CUR MEDS BY ELIG CLIN: HCPCS | Performed by: INTERNAL MEDICINE

## 2022-05-20 PROCEDURE — 99215 OFFICE O/P EST HI 40 MIN: CPT | Performed by: INTERNAL MEDICINE

## 2022-05-20 PROCEDURE — 71046 X-RAY EXAM CHEST 2 VIEWS: CPT | Performed by: INTERNAL MEDICINE

## 2022-05-20 PROCEDURE — 2022F DILAT RTA XM EVC RTNOPTHY: CPT | Performed by: INTERNAL MEDICINE

## 2022-05-20 PROCEDURE — 93000 ELECTROCARDIOGRAM COMPLETE: CPT | Performed by: INTERNAL MEDICINE

## 2022-05-20 PROCEDURE — 3046F HEMOGLOBIN A1C LEVEL >9.0%: CPT | Performed by: INTERNAL MEDICINE

## 2022-05-20 PROCEDURE — G8417 CALC BMI ABV UP PARAM F/U: HCPCS | Performed by: INTERNAL MEDICINE

## 2022-05-20 PROCEDURE — G8432 DEP SCR NOT DOC, RNG: HCPCS | Performed by: INTERNAL MEDICINE

## 2022-05-20 NOTE — PROGRESS NOTES
Chief Complaint   Patient presents with    Shortness of Breath     Patient is here for shortness of breath      1. Have you been to the ER, urgent care clinic since your last visit? Hospitalized since your last visit? No    2. Have you seen or consulted any other health care providers outside of the [address] [place] [address] since your last visit? Include any pap smears or colon screening.  No

## 2022-05-20 NOTE — PROGRESS NOTES
SPORTS MEDICINE AND PRIMARY CARE  Randa Keith MD, [address] [address]  Phone:  [phone]  Fax: [phone]       Chief Complaint   Patient presents with    Shortness of Breath     Patient is here for shortness of breath. Patient caregiver thinks she has had shortness of breath for a while. .      SUBJECTIVE:    Oz Saeed is a 49 y.o. female Patient returns today with her niece, whose name is Rylie uHrtado, whose phone number is [phone]. She is concerned about some shortness of breath and breathing issues. She also needs a health aide to help with baths. Her mother is elderly now and unable to give her baths at home because of advancing age and arthritis. There have been no behavioral issues. She is not taking Seroquel and therefore we will discontinue it since she has not had a behavioral issue. Patient is seen for evaluation. Current Outpatient Medications   Medication Sig Dispense Refill    pravastatin (PRAVACHOL) 20 mg tablet Take 1 tablet by mouth nightly 90 Tablet 3     Past Medical History:   Diagnosis Date    DM [facility]di[facility] mellitus) ([facility] [address]     Goiter     Intellectual disability     Obesity, morbid (HCC)     GARCÍA on CPAP     8 cm    S/P reduction mammoplasty 1995    S/P ANDRE (total abdominal hysterectomy)     Thyroid nodule 12/18/2018    Consistent with a benign follicular nodule (includes adenomatoid nodule - fna     No past surgical history on file.   No Known Allergies      REVIEW OF SYSTEMS:  General: negative for - chills or fever  ENT: negative for - headaches, nasal congestion or tinnitus  Respiratory: negative for - cough, hemoptysis, shortness of breath or wheezing  Cardiovascular : negative for - chest pain, edema, palpitations or shortness of breath  Gastrointestinal: negative for - abdominal pain, blood in stools, heartburn or nausea/vomiting  Genito-Urinary: no dysuria, trouble voiding, or hematuria  Musculoskeletal: negative for - gait disturbance, joint pain, joint stiffness or joint swelling  Neurological: no TIA or stroke symptoms  Hematologic: no bruises, no bleeding, no swollen glands  Integument: no lumps, mole changes, nail changes or rash  Endocrine: no malaise/lethargy or unexpected weight changes      Social History     Socioeconomic History    Marital status: SINGLE   Tobacco Use    Smoking status: Never Smoker    Smokeless tobacco: Never Used   Vaping Use    Vaping Use: Never used   Substance and Sexual Activity    Alcohol use: No    Drug use: No    Sexual activity: Not Currently   Social History Narrative    Derek Kapoor -niece  will be responsible if mother is no longer able to care for her  [phone]     Family History   Problem Relation Age of Onset    Hypertension Mother     Diabetes Father        OBJECTIVE:    Visit Vitals  /77   Pulse (!) 55   Temp 98 °F (36.7 °C)   Resp 16   Ht 5' 7\" (1.702 m)   Wt 259 lb 4.8 oz (117.6 kg)   SpO2 93%   BMI 40.61 kg/m²     CONSTITUTIONAL: well , well nourished, appears age appropriate  EYES: perrla, eom intact  ENMT:moist mucous membranes, pharynx clear  NECK: supple. Thyroid normal  RESPIRATORY: Chest: clear bilaterally   CARDIOVASCULAR: Heart: regular rate and rhythm  GASTROINTESTINAL: Abdomen: soft, bowel sounds active  HEMATOLOGIC: no pathological lymph nodes palpated  MUSCULOSKELETAL: Extremities: no edema, pulse 1+   INTEGUMENT: No unusual rashes or suspicious skin lesions noted. Nails appear normal.  NEUROLOGIC: non-focal exam   MENTAL STATUS: alert and oriented, appropriate affect           ASSESSMENT:  1. GARCÍA on CPAP    2. Obesity, Class III, BMI 40-49.9 (morbid obesity) ([address]    3. Type 2 diabetes mellitus without complication, without long-term current use of insulin (HCC)    4. Obesity, morbid ([address]    5. Goiter    6. Intellectual disability    7.  Shortness of breath       She has obstructive sleep apnea and should be using a CPAP machine, but she has never been able to use it. Her mother has tried all different kinds of ways to get her to use it. She would like her to try it again and see a sleep specialist.    Obesity remains a concern and we encourage a heart healthy, weight reducing diet. She has lost 7 pounds since we last saw her. She has type 2 diabetes and we will check hemoglobin A1c for control. She has a goiter, but I do not think the goiter is contributing to the trouble breathing. Intellectual disability is a major issue that is causing her health to be less than ideal, and I think the shortness of breath is related to obstructive sleep apnea, although I will check a BNP to rule out other pathology and we will try a chest xray. I have discussed the diagnosis with the patient and the intended plan as seen in the  Orders. The patient understands and agees with the plan. The patient has   received an after visit summary and questions were answered concerning  future plans  Patient labs and/or xrays were reviewed  Past records were reviewed. PLAN:  .  Orders Placed This Encounter    HEPATITIS C AB    CBC WITH AUTOMATED DIFF    METABOLIC PANEL, COMPREHENSIVE    LIPID PANEL    TSH 3RD GENERATION    HEMOGLOBIN A1C WITH EAG    NT-PRO BNP    AMB POC EKG ROUTINE W/ 12 LEADS, INTER & REP                  ATTENTION:   This medical record was transcribed using an electronic medical records system. Although proofread, it may and can contain electronic and spelling errors. Other human spelling and other errors may be present. Corrections may be executed at a later time. Please feel free to contact us for any clarifications as needed.

## 2022-05-21 PROBLEM — J39.8 TRACHEAL COMPRESSION: Status: ACTIVE | Noted: 2022-05-21

## 2022-05-21 LAB
ALBUMIN SERPL-MCNC: 3.6 G/DL (ref 3.5–5)
ALBUMIN/GLOB SERPL: 1 {RATIO} (ref 1.1–2.2)
ALP SERPL-CCNC: 64 U/L (ref 45–117)
ALT SERPL-CCNC: 17 U/L (ref 12–78)
ANION GAP SERPL CALC-SCNC: 6 MMOL/L (ref 5–15)
AST SERPL-CCNC: 11 U/L (ref 15–37)
BASOPHILS # BLD: 0 K/UL (ref 0–0.1)
BASOPHILS NFR BLD: 0 % (ref 0–1)
BILIRUB SERPL-MCNC: 0.5 MG/DL (ref 0.2–1)
BNP SERPL-MCNC: 105 PG/ML
BUN SERPL-MCNC: 9 MG/DL (ref 6–20)
BUN/CREAT SERPL: 10 (ref 12–20)
CALCIUM SERPL-MCNC: 9.1 MG/DL (ref 8.5–10.1)
CHLORIDE SERPL-SCNC: 105 MMOL/L (ref 97–108)
CHOLEST SERPL-MCNC: 213 MG/DL
CO2 SERPL-SCNC: 29 MMOL/L (ref 21–32)
CREAT SERPL-MCNC: 0.87 MG/DL (ref 0.55–1.02)
DIFFERENTIAL METHOD BLD: ABNORMAL
EOSINOPHIL # BLD: 0.1 K/UL (ref 0–0.4)
EOSINOPHIL NFR BLD: 1 % (ref 0–7)
ERYTHROCYTE [DISTWIDTH] IN BLOOD BY AUTOMATED COUNT: 16.3 % (ref 11.5–14.5)
EST. AVERAGE GLUCOSE BLD GHB EST-MCNC: 134 MG/DL
GLOBULIN SER CALC-MCNC: 3.6 G/DL (ref 2–4)
GLUCOSE SERPL-MCNC: 92 MG/DL (ref 65–100)
HBA1C MFR BLD: 6.3 % (ref 4–5.6)
HCT VFR BLD AUTO: 45.4 % (ref 35–47)
HCV AB SERPL QL IA: NONREACTIVE
HDLC SERPL-MCNC: 32 MG/DL
HDLC SERPL: 6.7 {RATIO} (ref 0–5)
HGB BLD-MCNC: 13.3 G/DL (ref 11.5–16)
IMM GRANULOCYTES # BLD AUTO: 0 K/UL (ref 0–0.04)
IMM GRANULOCYTES NFR BLD AUTO: 0 % (ref 0–0.5)
LDLC SERPL CALC-MCNC: 151.6 MG/DL (ref 0–100)
LYMPHOCYTES # BLD: 3.1 K/UL (ref 0.8–3.5)
LYMPHOCYTES NFR BLD: 35 % (ref 12–49)
MCH RBC QN AUTO: 25 PG (ref 26–34)
MCHC RBC AUTO-ENTMCNC: 29.3 G/DL (ref 30–36.5)
MCV RBC AUTO: 85.2 FL (ref 80–99)
MONOCYTES # BLD: 0.6 K/UL (ref 0–1)
MONOCYTES NFR BLD: 6 % (ref 5–13)
NEUTS SEG # BLD: 5 K/UL (ref 1.8–8)
NEUTS SEG NFR BLD: 58 % (ref 32–75)
NRBC # BLD: 0 K/UL (ref 0–0.01)
NRBC BLD-RTO: 0 PER 100 WBC
PLATELET # BLD AUTO: 358 K/UL (ref 150–400)
PMV BLD AUTO: 10.9 FL (ref 8.9–12.9)
POTASSIUM SERPL-SCNC: 4.1 MMOL/L (ref 3.5–5.1)
PROT SERPL-MCNC: 7.2 G/DL (ref 6.4–8.2)
RBC # BLD AUTO: 5.33 M/UL (ref 3.8–5.2)
SODIUM SERPL-SCNC: 140 MMOL/L (ref 136–145)
TRIGL SERPL-MCNC: 147 MG/DL (ref ?–150)
TSH SERPL DL<=0.05 MIU/L-ACNC: 1.11 UIU/ML (ref 0.36–3.74)
VLDLC SERPL CALC-MCNC: 29.4 MG/DL
WBC # BLD AUTO: 8.8 K/UL (ref 3.6–11)

## 2022-05-24 ENCOUNTER — TELEPHONE (OUTPATIENT)
Dept: SLEEP MEDICINE | Age: 47
End: 2022-05-24

## 2022-06-01 ENCOUNTER — OFFICE VISIT (OUTPATIENT)
Dept: SURGERY | Age: 47
End: 2022-06-01
Payer: MEDICARE

## 2022-06-01 VITALS
DIASTOLIC BLOOD PRESSURE: 79 MMHG | HEIGHT: 67 IN | SYSTOLIC BLOOD PRESSURE: 116 MMHG | RESPIRATION RATE: 10 BRPM | OXYGEN SATURATION: 95 % | WEIGHT: 255 LBS | HEART RATE: 94 BPM | BODY MASS INDEX: 40.02 KG/M2 | TEMPERATURE: 98 F

## 2022-06-01 DIAGNOSIS — J39.8 TRACHEAL COMPRESSION: Primary | ICD-10-CM

## 2022-06-01 PROCEDURE — 99202 OFFICE O/P NEW SF 15 MIN: CPT | Performed by: SURGERY

## 2022-06-01 PROCEDURE — G8427 DOCREV CUR MEDS BY ELIG CLIN: HCPCS | Performed by: SURGERY

## 2022-06-01 PROCEDURE — G8417 CALC BMI ABV UP PARAM F/U: HCPCS | Performed by: SURGERY

## 2022-06-01 PROCEDURE — G8510 SCR DEP NEG, NO PLAN REQD: HCPCS | Performed by: SURGERY

## 2022-06-01 NOTE — LETTER
6/2/2022    Patient: Antonella Lee   YOB: 1975   Date of Visit: 6/1/2022     Pk Singh MD  [facility]  [address] [address]  [address] [address], [facility]    Dear Pk Singh MD,      Thank you for referring Ms. Antonella Lee to [facility] [address] [place] [address] for evaluation. My notes for this consultation are attached. If you have questions, please do not hesitate to call me. I look forward to following your patient along with you.       Sincerely,    Jackie Freeman MD

## 2022-06-01 NOTE — PROGRESS NOTES
Subjective:      René Bethea  is a 49 y.o. female who presents for evaluation of goiter. She is referred to office today by Dr. Fidel Cabot. Pt has Hx of goiter for the past few years. She had FNA in 2018 that showed LT thyroid nodule was benign. Today, pt reports history of sleep apnea. Pt had XR CHEST PA LAT on 5/20/2022 for f/u that showed no acute process or change in the lung fields. 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 Today, she denies wheezing, SOB, or trouble breathing. Past Medical History:   Diagnosis Date    DM [facility]di[facility] mellitus) ([facility] [address]     Goiter     Intellectual disability     Obesity, morbid ([facility] [address]     GARCÍA on CPAP     8 cm    S/P reduction mammoplasty 1995    S/P ANDRE (total abdominal hysterectomy)     Thyroid nodule 12/18/2018    Consistent with a benign follicular nodule (includes adenomatoid nodule - fna    Tracheal compression 05/21/2022       Past Surgical History:   Procedure Laterality Date    HX GYN         Social History     Tobacco Use    Smoking status: Never Smoker    Smokeless tobacco: Never Used   Substance Use Topics    Alcohol use: No       Family History   Problem Relation Age of Onset    Hypertension Mother     Diabetes Father        Current Outpatient Medications on File Prior to Visit   Medication Sig Dispense Refill    pravastatin (PRAVACHOL) 20 mg tablet Take 1 tablet by mouth nightly 90 Tablet 3     No current facility-administered medications on file prior to visit.        No Known Allergies    Review of Systems:  Constitutional: No fever or chills  Endocrine: +DM, +goiter  Neurologic: No headache  Eyes: No scleral icterus or irritated eyes  Nose: No nasal pain or drainage  Mouth: No oral lesions or sore throat  Cardiac: No palpations or chest pain  Pulmonary: +GARCÍA  Gastrointestinal: No nausea, emesis, diarrhea, or constipation  Genitourinary: No dysuria  Musculoskeletal: No muscle or joint tenderness  Skin: No rashes or lesions  Psychiatric: No anxiety or depressed mood    Objective:     Visit Vitals  /79   Pulse 94   Temp 98 °F (36.7 °C) (Oral)   Resp 10   Ht 5' 7\" (1.702 m)   Wt 255 lb (115.7 kg)   SpO2 95%   BMI 39.94 kg/m²        Physical Exam:  General: obese  Eyes: PERRLA, no scleral icterus  HENT: Normocephalic without oral lesions  Neck: 2.5x bilaterally enlarged thyroid with no dominant masses  Cardiac: Normal pulse rate and rhythm  Pulmonary: Symmetric chest rise with normal effort  Abdomen: Soft, NT, ND, no hernia, no splenomegaly  Skin: Warm without rash  Extremities: No edema or joint stiffness  Psych: Appropriate mood and affect    Labs: No results found for this or any previous visit (from the past 24 hour(s)). Data Review: All previous imaging, testing and lab work was reviewed and interpreted. XR CHEST PA LAT 5/20/2022  Impression:   No acute process or change in the lung fields. 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 Assessment and Plan:       ICD-10-CM ICD-9-CM    1. Tracheal compression  J39.8 519.19        Pt instructed to leave area alone and observe unless or until area enlarges or becomes symptomatic and f/u as needed. All questions were answered and patient is in agreement with this plan. Total face to face time with patient: 15 minutes. Greater than 50% of the time was spent in counseling. This note was scribed by Momo Delong as dictated by Dr. Erica Krishna.      Signed By: Lev Calzada MD     06/02/22

## 2022-06-01 NOTE — PROGRESS NOTES
1. Have you been to the ER, urgent care clinic since your last visit? Hospitalized since your last visit? no    2. Have you seen or consulted any other health care providers outside of the [facility] since your last visit? Include any pap smears or colon screening. 5/20/22 PCP.

## 2022-06-18 ENCOUNTER — TELEPHONE (OUTPATIENT)
Dept: SLEEP MEDICINE | Age: 47
End: 2022-06-18

## 2022-06-18 NOTE — TELEPHONE ENCOUNTER
Called and lvm on house number to schedule sleep consult, per Dr. Hernandez Sender. Patient's cell phone someone picked up and stated \"wrong number\".

## 2022-08-29 ENCOUNTER — OFFICE VISIT (OUTPATIENT)
Dept: SLEEP MEDICINE | Age: 47
End: 2022-08-29
Payer: MEDICARE

## 2022-08-29 VITALS
WEIGHT: 251 LBS | TEMPERATURE: 98.7 F | DIASTOLIC BLOOD PRESSURE: 62 MMHG | OXYGEN SATURATION: 93 % | BODY MASS INDEX: 39.39 KG/M2 | SYSTOLIC BLOOD PRESSURE: 97 MMHG | HEART RATE: 93 BPM | HEIGHT: 67 IN

## 2022-08-29 DIAGNOSIS — G47.33 OSA (OBSTRUCTIVE SLEEP APNEA): Primary | ICD-10-CM

## 2022-08-29 DIAGNOSIS — F79 INTELLECTUAL DISABILITY: ICD-10-CM

## 2022-08-29 PROCEDURE — G8432 DEP SCR NOT DOC, RNG: HCPCS | Performed by: SPECIALIST

## 2022-08-29 PROCEDURE — 99204 OFFICE O/P NEW MOD 45 MIN: CPT | Performed by: SPECIALIST

## 2022-08-29 PROCEDURE — G8417 CALC BMI ABV UP PARAM F/U: HCPCS | Performed by: SPECIALIST

## 2022-08-29 PROCEDURE — G8427 DOCREV CUR MEDS BY ELIG CLIN: HCPCS | Performed by: SPECIALIST
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
## 2023-11-15 RX ORDER — PRAVASTATIN SODIUM 20 MG
TABLET ORAL
Qty: 90 TABLET | Refills: 0 | OUTPATIENT
Start: 2023-11-15

## 2023-11-16 RX ORDER — PRAVASTATIN SODIUM 20 MG
TABLET ORAL
Qty: 90 TABLET | Refills: 0 | Status: SHIPPED | OUTPATIENT
Start: 2023-11-16

## 2024-04-14 RX ORDER — PRAVASTATIN SODIUM 20 MG
TABLET ORAL
Qty: 90 TABLET | Refills: 0 | OUTPATIENT
Start: 2024-04-14

## 2024-04-22 RX ORDER — PRAVASTATIN SODIUM 20 MG
TABLET ORAL
Qty: 90 TABLET | Refills: 0 | OUTPATIENT
Start: 2024-04-22

## 2024-05-13 ENCOUNTER — OFFICE VISIT (OUTPATIENT)
Facility: CLINIC | Age: 49
End: 2024-05-13
Payer: MEDICARE

## 2024-05-13 VITALS
HEIGHT: 67 IN | DIASTOLIC BLOOD PRESSURE: 62 MMHG | RESPIRATION RATE: 24 BRPM | SYSTOLIC BLOOD PRESSURE: 93 MMHG | OXYGEN SATURATION: 93 % | BODY MASS INDEX: 39 KG/M2 | TEMPERATURE: 97 F | HEART RATE: 88 BPM | WEIGHT: 248.5 LBS

## 2024-05-13 DIAGNOSIS — F79 INTELLECTUAL DISABILITY: ICD-10-CM

## 2024-05-13 DIAGNOSIS — G47.33 OSA ON CPAP: ICD-10-CM

## 2024-05-13 DIAGNOSIS — Z12.11 SCREEN FOR COLON CANCER: ICD-10-CM

## 2024-05-13 DIAGNOSIS — E11.65 TYPE 2 DIABETES MELLITUS WITH HYPERGLYCEMIA, WITHOUT LONG-TERM CURRENT USE OF INSULIN (HCC): Primary | ICD-10-CM

## 2024-05-13 DIAGNOSIS — E78.5 DYSLIPIDEMIA: ICD-10-CM

## 2024-05-13 DIAGNOSIS — Z00.00 MEDICARE ANNUAL WELLNESS VISIT, SUBSEQUENT: ICD-10-CM

## 2024-05-13 DIAGNOSIS — E04.9 GOITER: ICD-10-CM

## 2024-05-13 PROCEDURE — G0439 PPPS, SUBSEQ VISIT: HCPCS | Performed by: INTERNAL MEDICINE

## 2024-05-13 SDOH — ECONOMIC STABILITY: FOOD INSECURITY: WITHIN THE PAST 12 MONTHS, YOU WORRIED THAT YOUR FOOD WOULD RUN OUT BEFORE YOU GOT MONEY TO BUY MORE.: NEVER TRUE

## 2024-05-13 SDOH — ECONOMIC STABILITY: FOOD INSECURITY: WITHIN THE PAST 12 MONTHS, THE FOOD YOU BOUGHT JUST DIDN'T LAST AND YOU DIDN'T HAVE MONEY TO GET MORE.: NEVER TRUE

## 2024-05-13 SDOH — ECONOMIC STABILITY: HOUSING INSECURITY
IN THE LAST 12 MONTHS, WAS THERE A TIME WHEN YOU DID NOT HAVE A STEADY PLACE TO SLEEP OR SLEPT IN A SHELTER (INCLUDING NOW)?: NO

## 2024-05-13 SDOH — ECONOMIC STABILITY: INCOME INSECURITY: HOW HARD IS IT FOR YOU TO PAY FOR THE VERY BASICS LIKE FOOD, HOUSING, MEDICAL CARE, AND HEATING?: NOT HARD AT ALL

## 2024-05-13 ASSESSMENT — PATIENT HEALTH QUESTIONNAIRE - PHQ9
SUM OF ALL RESPONSES TO PHQ QUESTIONS 1-9: 0
2. FEELING DOWN, DEPRESSED OR HOPELESS: NOT AT ALL
SUM OF ALL RESPONSES TO PHQ9 QUESTIONS 1 & 2: 0
2. FEELING DOWN, DEPRESSED OR HOPELESS: NOT AT ALL
SUM OF ALL RESPONSES TO PHQ QUESTIONS 1-9: 0
SUM OF ALL RESPONSES TO PHQ QUESTIONS 1-9: 0
1. LITTLE INTEREST OR PLEASURE IN DOING THINGS: NOT AT ALL
SUM OF ALL RESPONSES TO PHQ QUESTIONS 1-9: 0
SUM OF ALL RESPONSES TO PHQ QUESTIONS 1-9: 0
1. LITTLE INTEREST OR PLEASURE IN DOING THINGS: NOT AT ALL
SUM OF ALL RESPONSES TO PHQ9 QUESTIONS 1 & 2: 0

## 2024-05-13 ASSESSMENT — LIFESTYLE VARIABLES
HOW MANY STANDARD DRINKS CONTAINING ALCOHOL DO YOU HAVE ON A TYPICAL DAY: PATIENT DOES NOT DRINK
HOW OFTEN DO YOU HAVE A DRINK CONTAINING ALCOHOL: NEVER

## 2024-05-13 NOTE — PROGRESS NOTES
SPORTS MEDICINE AND PRIMARY CARE  Steven Traore MD, FACP, CMD  [facility] [facility] [address]  Phone:  [phone]  Fax: [phone]       Chief Complaint   Patient presents with    Cholesterol Problem    Medicare AW   .      SUBJECTIVE:    Livia Byers is a 49 y.o. female  Dictation on: 05/13/2024 11:57 AM by: STEVEN TRAORE [[id number]]          Current Outpatient Medications   Medication Sig Dispense Refill    pravastatin (PRAVACHOL) 20 MG tablet Take 1 tablet by mouth nightly 90 tablet 0     No current facility-administered medications for this visit.     Past Medical History:   Diagnosis Date    DM (diabetes mellitus) (HCC)     Goiter     Intellectual disability     Obesity, morbid (HCC)     GIANNI on CPAP     8 cm    S/P reduction mammoplasty 1995    S/P SONI (total abdominal hysterectomy)     Thyroid nodule 12/18/2018    Consistent with a benign follicular nodule (includes adenomatoid nodule - fna    Tracheal compression 05/21/2022     Past Surgical History:   Procedure Laterality Date    GYN       No Known Allergies      REVIEW OF SYSTEMS:  General: negative for - chills or fever  ENT: negative for - headaches, nasal congestion or tinnitus  Respiratory: negative for - cough, hemoptysis, shortness of breath or wheezing  Cardiovascular : negative for - chest pain, edema, palpitations or shortness of breath  Gastrointestinal: negative for - abdominal pain, blood in stools, heartburn or nausea/vomiting  Genito-Urinary: no dysuria, trouble voiding, or hematuria  Musculoskeletal: negative for - gait disturbance, joint pain, joint stiffness or joint swelling  Neurological: no TIA or stroke symptoms  Hematologic: no bruises, no bleeding, no swollen glands  Integument: no lumps, mole changes, nail changes or rash  Endocrine: no malaise/lethargy or unexpected weight changes      Social History     Socioeconomic History    Marital status: Single     Spouse name: None    Number of children: None    Years

## 2024-05-13 NOTE — PATIENT INSTRUCTIONS
think you may have a problem with alcohol or drug use, talk to your doctor.   Medicines    Take your medicines exactly as prescribed. Call your doctor if you think you are having a problem with your medicine.     If your doctor recommends aspirin, take the amount directed each day. Make sure you take aspirin and not another kind of pain reliever, such as acetaminophen (Tylenol).   When should you call for help?   Call 911 if you have symptoms of a heart attack. These may include:    Chest pain or pressure, or a strange feeling in the chest.     Sweating.     Shortness of breath.     Pain, pressure, or a strange feeling in the back, neck, jaw, or upper belly or in one or both shoulders or arms.     Lightheadedness or sudden weakness.     A fast or irregular heartbeat.   After you call 911, the  may tell you to chew 1 adult-strength or 2 to 4 low-dose aspirin. Wait for an ambulance. Do not try to drive yourself.  Watch closely for changes in your health, and be sure to contact your doctor if you have any problems.  Where can you learn more?  Go to https://www.[employer].net/patientEd and enter F075 to learn more about \"A Healthy Heart: Care Instructions.\"  Current as of: June 24, 2023               Content Version: 14.0  © 2006-2024 [employer].   Care instructions adapted under license by [employer]. If you have questions about a medical condition or this instruction, always ask your healthcare professional. [employer] disclaims any warranty or liability for your use of this information.      Personalized Preventive Plan for Livia Byers - 5/13/2024  Medicare offers a range of preventive health benefits. Some of the tests and screenings are paid in full while other may be subject to a deductible, co-insurance, and/or copay.    Some of these benefits include a comprehensive review of your medical history including lifestyle, illnesses that may run in your family, and various

## 2024-05-13 NOTE — PROGRESS NOTES
Chief Complaint   Patient presents with    Cholesterol Problem     \"Have you been to the ER, urgent care clinic since your last visit?  Hospitalized since your last visit?\"    NO    “Have you seen or consulted any other health care providers outside of [facility] since your last visit?”    NO    Have you had a mammogram?”   NO    No breast cancer screening on file         “Have you had a colorectal cancer screening such as a colonoscopy/FIT/Cologuard?    NO    No colonoscopy on file  No cologuard on file  No FIT/FOBT on file   No flexible sigmoidoscopy on file         Click Here for Release of Records Request

## 2024-05-14 LAB
ALBUMIN SERPL-MCNC: 3.6 G/DL (ref 3.5–5)
ALBUMIN/GLOB SERPL: 1 (ref 1.1–2.2)
ALP SERPL-CCNC: 62 U/L (ref 45–117)
ALT SERPL-CCNC: 13 U/L (ref 12–78)
ANION GAP SERPL CALC-SCNC: 5 MMOL/L (ref 5–15)
AST SERPL-CCNC: 12 U/L (ref 15–37)
BASOPHILS # BLD: 0 K/UL (ref 0–0.1)
BASOPHILS NFR BLD: 1 % (ref 0–1)
BILIRUB SERPL-MCNC: 0.6 MG/DL (ref 0.2–1)
BUN SERPL-MCNC: 9 MG/DL (ref 6–20)
BUN/CREAT SERPL: 10 (ref 12–20)
CALCIUM SERPL-MCNC: 9.4 MG/DL (ref 8.5–10.1)
CHLORIDE SERPL-SCNC: 106 MMOL/L (ref 97–108)
CHOLEST SERPL-MCNC: 217 MG/DL
CO2 SERPL-SCNC: 30 MMOL/L (ref 21–32)
CREAT SERPL-MCNC: 0.87 MG/DL (ref 0.55–1.02)
DIFFERENTIAL METHOD BLD: ABNORMAL
EOSINOPHIL # BLD: 0.1 K/UL (ref 0–0.4)
EOSINOPHIL NFR BLD: 1 % (ref 0–7)
ERYTHROCYTE [DISTWIDTH] IN BLOOD BY AUTOMATED COUNT: 15.5 % (ref 11.5–14.5)
EST. AVERAGE GLUCOSE BLD GHB EST-MCNC: 128 MG/DL
GLOBULIN SER CALC-MCNC: 3.6 G/DL (ref 2–4)
GLUCOSE SERPL-MCNC: 95 MG/DL (ref 65–100)
HBA1C MFR BLD: 6.1 % (ref 4–5.6)
HCT VFR BLD AUTO: 41.7 % (ref 35–47)
HDLC SERPL-MCNC: 41 MG/DL
HDLC SERPL: 5.3 (ref 0–5)
HGB BLD-MCNC: 12.8 G/DL (ref 11.5–16)
IMM GRANULOCYTES # BLD AUTO: 0 K/UL (ref 0–0.04)
IMM GRANULOCYTES NFR BLD AUTO: 0 % (ref 0–0.5)
LDLC SERPL CALC-MCNC: 155.8 MG/DL (ref 0–100)
LYMPHOCYTES # BLD: 2.8 K/UL (ref 0.8–3.5)
LYMPHOCYTES NFR BLD: 37 % (ref 12–49)
MCH RBC QN AUTO: 25.4 PG (ref 26–34)
MCHC RBC AUTO-ENTMCNC: 30.7 G/DL (ref 30–36.5)
MCV RBC AUTO: 82.9 FL (ref 80–99)
MONOCYTES # BLD: 0.4 K/UL (ref 0–1)
MONOCYTES NFR BLD: 5 % (ref 5–13)
NEUTS SEG # BLD: 4.4 K/UL (ref 1.8–8)
NEUTS SEG NFR BLD: 56 % (ref 32–75)
NRBC # BLD: 0 K/UL (ref 0–0.01)
NRBC BLD-RTO: 0 PER 100 WBC
PLATELET # BLD AUTO: 216 K/UL (ref 150–400)
PMV BLD AUTO: 11.9 FL (ref 8.9–12.9)
POTASSIUM SERPL-SCNC: 3.9 MMOL/L (ref 3.5–5.1)
PROT SERPL-MCNC: 7.2 G/DL (ref 6.4–8.2)
RBC # BLD AUTO: 5.03 M/UL (ref 3.8–5.2)
SODIUM SERPL-SCNC: 141 MMOL/L (ref 136–145)
TRIGL SERPL-MCNC: 101 MG/DL
TSH SERPL DL<=0.05 MIU/L-ACNC: 0.98 UIU/ML (ref 0.36–3.74)
VLDLC SERPL CALC-MCNC: 20.2 MG/DL
WBC # BLD AUTO: 7.7 K/UL (ref 3.6–11)

## 2024-11-04 ENCOUNTER — OFFICE VISIT (OUTPATIENT)
Facility: CLINIC | Age: 49
End: 2024-11-04
Payer: MEDICARE

## 2024-11-04 VITALS
OXYGEN SATURATION: 96 % | WEIGHT: 254.3 LBS | SYSTOLIC BLOOD PRESSURE: 113 MMHG | HEIGHT: 67 IN | BODY MASS INDEX: 39.91 KG/M2 | TEMPERATURE: 98 F | HEART RATE: 76 BPM | DIASTOLIC BLOOD PRESSURE: 74 MMHG | RESPIRATION RATE: 28 BRPM

## 2024-11-04 DIAGNOSIS — J39.8 TRACHEAL COMPRESSION: ICD-10-CM

## 2024-11-04 DIAGNOSIS — G47.33 OSA ON CPAP: ICD-10-CM

## 2024-11-04 DIAGNOSIS — E11.65 TYPE 2 DIABETES MELLITUS WITH HYPERGLYCEMIA, WITHOUT LONG-TERM CURRENT USE OF INSULIN (HCC): Primary | ICD-10-CM

## 2024-11-04 DIAGNOSIS — F79 INTELLECTUAL DISABILITY: ICD-10-CM

## 2024-11-04 DIAGNOSIS — Z12.11 SCREEN FOR COLON CANCER: ICD-10-CM

## 2024-11-04 PROCEDURE — 36415 COLL VENOUS BLD VENIPUNCTURE: CPT | Performed by: INTERNAL MEDICINE

## 2024-11-04 PROCEDURE — 3044F HG A1C LEVEL LT 7.0%: CPT | Performed by: INTERNAL MEDICINE

## 2024-11-04 PROCEDURE — 99214 OFFICE O/P EST MOD 30 MIN: CPT | Performed by: INTERNAL MEDICINE

## 2024-11-04 NOTE — PROGRESS NOTES
Chief Complaint   Patient presents with    Annual Exam     \"Have you been to the ER, urgent care clinic since your last visit?  Hospitalized since your last visit?\"    NO    “Have you seen or consulted any other health care providers outside our system since your last visit?”    NO    Have you had a mammogram?”   NO    No breast cancer screening on file       “Have you had a colorectal cancer screening such as a colonoscopy/FIT/Cologuard?    NO    No colonoscopy on file  No cologuard on file  No FIT/FOBT on file   No flexible sigmoidoscopy on file     “Have you had a diabetic eye exam?”    NO     Date of last diabetic eye exam: 8/14/2019            
weight changes      Social History     Socioeconomic History    Marital status: Single     Spouse name: None    Number of children: None    Years of education: None    Highest education level: None   Tobacco Use    Smoking status: Never     Passive exposure: Never    Smokeless tobacco: Never   Substance and Sexual Activity    Alcohol use: No    Drug use: No   Social History Narrative    Laure Paul -niece  will be responsible if mother is no longer able to care for her  [phone]     Social Determinants of Health     Financial Resource Strain: Low Risk  (5/13/2024)    Overall Financial Resource Strain (CARDIA)     Difficulty of Paying Living Expenses: Not hard at all   Food Insecurity: No Food Insecurity (5/13/2024)    Hunger Vital Sign     Worried About Running Out of Food in the Last Year: Never true     Ran Out of Food in the Last Year: Never true   Transportation Needs: Unknown (5/13/2024)    PRAPARE - Transportation     Lack of Transportation (Non-Medical): No   Physical Activity: Inactive (5/13/2024)    Exercise Vital Sign     Days of Exercise per Week: 0 days     Minutes of Exercise per Session: 0 min   Housing Stability: Unknown (5/13/2024)    Housing Stability Vital Sign     Unstable Housing in the Last Year: No     Family History   Problem Relation Age of Onset    Hypertension Mother     Diabetes Father        OBJECTIVE:    /74 (Site: Left Upper Arm, Position: Sitting, Cuff Size: Large Adult)   Pulse 76   Temp 98 °F (36.7 °C) (Oral)   Resp 28   Ht 1.702 m (5' 7\")   Wt 115.3 kg (254 lb 4.8 oz)   SpO2 96%   BMI 39.83 kg/m²   CONSTITUTIONAL: well , well nourished, appears age appropriate  EYES: perrla, eom intact  ENMT:moist mucous membranes, pharynx clear  NECK: supple. Thyroid normal  RESPIRATORY: Chest: clear bilaterally   CARDIOVASCULAR: Heart: regular rate and rhythm  GASTROINTESTINAL: Abdomen: soft, bowel sounds active  HEMATOLOGIC: no pathological lymph nodes

## 2024-11-05 LAB
EST. AVERAGE GLUCOSE BLD GHB EST-MCNC: 134 MG/DL
HBA1C MFR BLD: 6.3 % (ref 4–5.6)

## 2025-08-07 ENCOUNTER — TELEPHONE (OUTPATIENT)
Facility: CLINIC | Age: 50
End: 2025-08-07